# Patient Record
Sex: FEMALE | Race: BLACK OR AFRICAN AMERICAN | Employment: UNEMPLOYED | URBAN - METROPOLITAN AREA
[De-identification: names, ages, dates, MRNs, and addresses within clinical notes are randomized per-mention and may not be internally consistent; named-entity substitution may affect disease eponyms.]

---

## 2022-05-09 ENCOUNTER — HOSPITAL ENCOUNTER (EMERGENCY)
Age: 47
Discharge: HOME | End: 2022-05-09
Attending: EMERGENCY MEDICINE
Payer: COMMERCIAL

## 2022-05-09 ENCOUNTER — APPOINTMENT (OUTPATIENT)
Dept: GENERAL RADIOLOGY | Age: 47
End: 2022-05-09
Attending: EMERGENCY MEDICINE
Payer: COMMERCIAL

## 2022-05-09 VITALS
TEMPERATURE: 99 F | HEIGHT: 63 IN | SYSTOLIC BLOOD PRESSURE: 154 MMHG | WEIGHT: 250 LBS | DIASTOLIC BLOOD PRESSURE: 86 MMHG | BODY MASS INDEX: 44.3 KG/M2 | RESPIRATION RATE: 20 BRPM | HEART RATE: 100 BPM | OXYGEN SATURATION: 100 %

## 2022-05-09 DIAGNOSIS — S69.92XA INJURY OF LEFT LITTLE FINGER, INITIAL ENCOUNTER: Primary | ICD-10-CM

## 2022-05-09 PROCEDURE — 99283 EMERGENCY DEPT VISIT LOW MDM: CPT

## 2022-05-09 PROCEDURE — 73140 X-RAY EXAM OF FINGER(S): CPT

## 2022-05-09 RX ORDER — DOXYCYCLINE 100 MG/1
100 TABLET ORAL 2 TIMES DAILY
COMMUNITY

## 2022-05-09 RX ORDER — TRIAMCINOLONE ACETONIDE 1 MG/G
OINTMENT TOPICAL 2 TIMES DAILY
COMMUNITY

## 2022-05-10 NOTE — ED PROVIDER NOTES
80-year-old female presents with complaint of left fifth finger injury. States that she was doing things around the house and she was attempting to move a shower stool when her left fifth finger was pushed backward. Reports bruising and swelling of the base of left third finger. Reports history of previous left PIP dislocation around 20 to 30 years ago. Denies numbness, tingling, weakness. Denies being on any anticoagulation. Denies any other surgeries or injuries. The history is provided by the patient. No  was used. Hand Swelling   This is a new problem. The problem occurs constantly. The problem has not changed since onset. The pain is present in the left fingers. The quality of the pain is described as sharp. The pain is at a severity of 3/10. The pain is mild. Pertinent negatives include no numbness, no stiffness, no tingling and no neck pain. She has tried nothing for the symptoms. The treatment provided no relief. No past medical history on file. No past surgical history on file. No family history on file. Social History     Socioeconomic History    Marital status:      Spouse name: Not on file    Number of children: Not on file    Years of education: Not on file    Highest education level: Not on file   Occupational History    Not on file   Tobacco Use    Smoking status: Not on file    Smokeless tobacco: Not on file   Substance and Sexual Activity    Alcohol use: Not on file    Drug use: Not on file    Sexual activity: Not on file   Other Topics Concern    Not on file   Social History Narrative    Not on file     Social Determinants of Health     Financial Resource Strain:     Difficulty of Paying Living Expenses: Not on file   Food Insecurity:     Worried About Running Out of Food in the Last Year: Not on file    Gustavo of Food in the Last Year: Not on file   Transportation Needs:     Lack of Transportation (Medical):  Not on file    Problem: Patient Care Overview  Goal: Team Discussion  Team Plan:   BEHAVIORAL TEAM DISCUSSION    Participants: Margoth REBOLLEDO RN, Dr. Fabian Johnson MD, Eula BENJAMIN [facility]  Progress: Initial behavioral team discussion.   Continued Stay Criteria/Rationale: Pt admitted voluntary due to psychotic symptomology.   Medical/Physical: See medical consult if applicable.   Precautions:   Behavioral Orders   Procedures     Code 1 - Restrict to Unit     Routine Programming     As clinically indicated     Status 15     Every 15 minutes.     Suicide precautions     Patients on Suicide Precautions should have a Combination Diet ordered that includes a Diet selection(s) AND a Behavioral Tray selection for Safe Tray - with utensils, or Safe Tray - NO utensils       Plan: The plan is to assess the patient for mental health and medication needs.  The patient will be prescribed medications to treat the identified symptoms.  Upon discharge the patient will be referred to services as appropriate based on the assessment.  Rationale for change in precautions or plan: No change, initial plan of care.           Lack of Transportation (Non-Medical): Not on file   Physical Activity:     Days of Exercise per Week: Not on file    Minutes of Exercise per Session: Not on file   Stress:     Feeling of Stress : Not on file   Social Connections:     Frequency of Communication with Friends and Family: Not on file    Frequency of Social Gatherings with Friends and Family: Not on file    Attends Sabianist Services: Not on file    Active Member of [address] [place] [address] or Organizations: Not on file    Attends Club or Organization Meetings: Not on file    Marital Status: Not on file   Intimate Partner Violence:     Fear of Current or Ex-Partner: Not on file    Emotionally Abused: Not on file    Physically Abused: Not on file    Sexually Abused: Not on file   Housing Stability:     Unable to Pay for Housing in the Last Year: Not on file    Number of [place] in the Last Year: Not on file    Unstable Housing in the Last Year: Not on file         ALLERGIES: Amitriptyline and Vicodin [hydrocodone-acetaminophen]    Review of Systems   Constitutional: Negative for fatigue and fever. Respiratory: Negative for cough and shortness of breath. Cardiovascular: Negative for chest pain. Gastrointestinal: Negative for abdominal pain, nausea and vomiting. Musculoskeletal: Positive for arthralgias and joint swelling. Negative for neck pain, neck stiffness and stiffness. Skin: Positive for color change. Negative for pallor, rash and wound. Neurological: Negative for tingling, weakness, numbness and headaches. Hematological: Does not bruise/bleed easily. Vitals:    05/09/22 2001   BP: (!) 154/86   Pulse: 100   Resp: 20   Temp: 99 °F (37.2 °C)   SpO2: 99%   Weight: 113.4 kg (250 lb)   Height: 5' 3\" (1.6 m)            Physical Exam  Vitals and nursing note reviewed. Constitutional:       Appearance: Normal appearance. HENT:      Head: Normocephalic.       Mouth/Throat:      Mouth: Mucous membranes are moist.   Eyes: Extraocular Movements: Extraocular movements intact. Pupils: Pupils are equal, round, and reactive to light. Cardiovascular:      Rate and Rhythm: Normal rate. Pulses: Normal pulses. Heart sounds: Normal heart sounds. Pulmonary:      Effort: Pulmonary effort is normal.      Breath sounds: Normal breath sounds. Abdominal:      General: Bowel sounds are normal.      Palpations: Abdomen is soft. Tenderness: There is no abdominal tenderness. There is no guarding or rebound. Musculoskeletal:         General: Tenderness present. Left hand: Swelling and tenderness present. No deformity or lacerations. Normal strength. Normal sensation. There is no disruption of two-point discrimination. Normal capillary refill. Normal pulse. Hands:       Comments: Bruising and mild swelling noted to proximal aspect of the left fourth digit at PIP. Full range of motion of left fifth digit at DIP and PIP. No crepitus. No deformity. Cap refill <3 sec. NVID. FROM L wrist and remainder of L 1-4th digits. Normal sensation. Skin:     General: Skin is warm. Findings: Bruising present. No erythema or rash. Neurological:      General: No focal deficit present. Mental Status: She is alert and oriented to person, place, and time. Cranial Nerves: No cranial nerve deficit. Sensory: No sensory deficit. Motor: No weakness. MDM  Number of Diagnoses or Management Options  Injury of left little finger, initial encounter: new and requires workup  Diagnosis management comments: X-ray with findings noted below. No dislocation. Likely acute avulsion fracture. Will place in finger splint with instructions to follow-up with hand surgeon. Instructed to rest, ice, NSAIDs for pain control. Patient given return precautions.        Amount and/or Complexity of Data Reviewed  Tests in the radiology section of CPT®: ordered and reviewed  Review and summarize past medical records: yes  Independent visualization of images, tracings, or specimens: yes    Risk of Complications, Morbidity, and/or Mortality  Presenting problems: moderate  Diagnostic procedures: low  Management options: low    Patient Progress  Patient progress: stable    ED Course as of 05/09/22 2139   Mon May 09, 2022   2130 XR L 5th finger Findings: On the lateral view there is a tiny well-defined 2 mm calcification or  bony fragment abutting the anterior distal aspect of the fifth proximal phalanx. This appearance is likely chronic, and could be due to a remote injury or  degenerative change. Correlate with focal point tenderness to exclude the  possibility of an acute avulsion.     There is otherwise no evidence of displaced fracture, dislocation, or aggressive  osseous lesion, or erosion. There is soft tissue swelling.        IMPRESSION  1. . Proximal phalanx finding on one view as above. 2. No acute osseous abnormality otherwise. [DF]      ED Course User Index  [DF] Link Arreola MD       Procedures                   Chi Rodríguez., MD; 5/11/2022 @12:19 AM Voice dictation software was used during the making of this note. This software is not perfect and grammatical and other typographical errors may be present.   This note has not been proofread for errors.  ===================================================================

## 2022-05-10 NOTE — DISCHARGE INSTRUCTIONS
Keep the left fourth finger and finger splint. Take Tylenol/ibuprofen as directed for pain control. Schedule close follow-up with [facility] orthopedic surgery. Call [phone] to schedule appointment.

## 2022-05-10 NOTE — ED NOTES
I have reviewed discharge instructions with the patient. The patient verbalized understanding. Patient left ED via Discharge Method: ambulatory to Home with . Opportunity for questions and clarification provided. Patient given 0 scripts. The pt was in no acute distress at AL. To continue your aftercare when you leave the hospital, you may receive an automated call from our care team to check in on how you are doing. This is a free service and part of our promise to provide the best care and service to meet your aftercare needs.  If you have questions, or wish to unsubscribe from this service please call [phone]. Thank you for Choosing our [employer] [facility]. Never smoker

## 2022-05-11 PROBLEM — G56.01 CARPAL TUNNEL SYNDROME ON RIGHT: Status: ACTIVE | Noted: 2022-05-11

## 2022-05-11 PROBLEM — S62.627A CLOSED DISPLACED FRACTURE OF MIDDLE PHALANX OF LEFT LITTLE FINGER: Status: ACTIVE | Noted: 2022-05-11

## 2022-05-11 PROBLEM — G56.02 CARPAL TUNNEL SYNDROME ON LEFT: Status: ACTIVE | Noted: 2022-05-11

## 2022-06-10 ENCOUNTER — OFFICE VISIT (OUTPATIENT)
Dept: ORTHOPEDIC SURGERY | Age: 47
End: 2022-06-10
Payer: COMMERCIAL

## 2022-06-10 DIAGNOSIS — S62.627A CLOSED DISPLACED FRACTURE OF MIDDLE PHALANX OF LEFT LITTLE FINGER, INITIAL ENCOUNTER: Primary | ICD-10-CM

## 2022-06-10 DIAGNOSIS — G56.01 CARPAL TUNNEL SYNDROME ON RIGHT: ICD-10-CM

## 2022-06-10 DIAGNOSIS — G56.02 CARPAL TUNNEL SYNDROME ON LEFT: ICD-10-CM

## 2022-06-10 DIAGNOSIS — M77.11 RIGHT TENNIS ELBOW: ICD-10-CM

## 2022-06-10 PROCEDURE — 99214 OFFICE O/P EST MOD 30 MIN: CPT | Performed by: ORTHOPAEDIC SURGERY

## 2022-06-10 PROCEDURE — 20526 THER INJECTION CARP TUNNEL: CPT | Performed by: ORTHOPAEDIC SURGERY

## 2022-06-10 PROCEDURE — 20551 NJX 1 TENDON ORIGIN/INSJ: CPT | Performed by: ORTHOPAEDIC SURGERY

## 2022-06-10 RX ORDER — MELOXICAM 15 MG/1
15 TABLET ORAL DAILY
Qty: 30 TABLET | Refills: 0 | Status: SHIPPED | OUTPATIENT
Start: 2022-06-10 | End: 2022-07-10

## 2022-06-10 RX ORDER — BETAMETHASONE SODIUM PHOSPHATE AND BETAMETHASONE ACETATE 3; 3 MG/ML; MG/ML
6 INJECTION, SUSPENSION INTRA-ARTICULAR; INTRALESIONAL; INTRAMUSCULAR; SOFT TISSUE ONCE
Status: COMPLETED | OUTPATIENT
Start: 2022-06-10 | End: 2022-06-10

## 2022-06-10 RX ADMIN — BETAMETHASONE SODIUM PHOSPHATE AND BETAMETHASONE ACETATE 6 MG: 3; 3 INJECTION, SUSPENSION INTRA-ARTICULAR; INTRALESIONAL; INTRAMUSCULAR; SOFT TISSUE at 08:02

## 2022-06-10 NOTE — PROGRESS NOTES
none    Assessment:   1. Closed displaced fracture of middle phalanx of left little finger, initial encounter    2. Carpal tunnel syndrome on right    3. Carpal tunnel syndrome on left    4. Right tennis elbow        Plan:   We discussed the diagnosis and different treatment options. We discussed observation, therapy, antiinflammatory medications and other pertinent treatment modalities. After discussing in detail the patient elects to proceed with right carpal tunnel steroid injection, right lateral epicondyle steroid injection, Mobic 15 daily for 4 weeks, we will obtain a nerve conduction study to assess severity of bilateral carpal tunnel syndrome, home exercise program for tennis elbow was discussed, she will wear the wrist brace at nighttime and when driving. We discussed that she has several overlapping problems, her shoulder pain is likely compensating for the elbow pain which is much more severe. I will reassess in 2 months, at that point we will review the nerve conduction study and determine further care for all her conditions. Procedure Note    The risk, benefits and alternatives of injection and no injection therapy were discussed. The patient consented for an injection. The patient has been identified by name and birthdate. The injection site was identified, marked and prepped with alcohol swabs. Time out completed. The Right carpal tunnel was injected with 1ml of 6mg/ml Betamethasone and 1ml Lidocaine plain 1%. The injection site was then dressed with a bandaid. The patient tolerated the injection well. The patient was instructed to monitor their blood sugars if diabetic and call if any concerns. The patient was instructed to call the office if any adverse local effects occurred or any if any questions or concerns arise. Procedure Note    The risk, benefits and alternatives of injection and no injection therapy were discussed. The patient consented for an injection.  The patient has been identified by name and birthdate. The injection site was identified, marked and prepped with alcohol swabs. Time out completed. The Right lateral epicondyle was injected with a 22 gauge needle with 2ml of 6mg/ml Betamethasone and 2ml Lidocaine plain 1%. After the area was numb, the 22 gauge needle was used to break down scar tissue and to create a bleeding bony bed to aid in tendon healing. The injection site was then dressed with a bandaid. The patient tolerated the injection well. The patient was instructed to monitor their blood sugars if diabetic and call if any concerns. The patient was instructed to call the office if any adverse local effects occurred or any if any questions or concerns arise. Patient voiced accordance and understanding of the information provided and the formulated plan. All questions were answered to the patient's satisfaction during the encounter.     Sidney Spatz, MD  Orthopaedic Surgery  06/10/22  8:01 AM

## 2022-06-10 NOTE — PATIENT INSTRUCTIONS
Patient Education        Tennis Elbow: Exercises  Introduction  Here are some examples of exercises for you to try. The exercises may be suggested for a condition or for rehabilitation. Start each exercise slowly. Ease off the exercises if you start to have pain. You will be told when to start these exercises and which ones will work bestfor you. How to do the exercises  Wrist flexor stretch    1. Extend your arm in front of you with your palm up. 2. Bend your wrist, pointing your hand toward the floor. 3. With your other hand, gently bend your wrist farther until you feel a mild to moderate stretch in your forearm. 4. Hold for at least 15 to 30 seconds. Repeat 2 to 4 times. Wrist extensor stretch    1. Repeat steps 1 to 4 of the stretch above but begin with your extended hand palm down. Ball or sock squeeze    1. Hold a tennis ball (or a rolled-up sock) in your hand. 2. Make a fist around the ball (or sock) and squeeze. 3. Hold for about 6 seconds, and then relax for up to 10 seconds. 4. Repeat 8 to 12 times. 5. Switch the ball (or sock) to your other hand and do 8 to 12 times. Wrist deviation    1. Sit so that your arm is supported but your hand hangs off the edge of a flat surface, such as a table. 2. Hold your hand out like you are shaking hands with someone. 3. Move your hand up and down. 4. Repeat this motion 8 to 12 times. 5. Switch arms. 6. Try to do this exercise twice with each hand. Wrist curls    1. Place your forearm on a table with your hand hanging over the edge of the table, palm up. 2. Place a 1- to 2-pound weight in your hand. This may be a dumbbell, a can of food, or a filled water bottle. 3. Slowly raise and lower the weight while keeping your forearm on the table and your palm facing up. 4. Repeat this motion 8 to 12 times. 5. Switch arms, and do steps 1 through 4.  6. Repeat with your hand facing down toward the floor. Switch arms. Biceps curls    1.  Sit leaning forward with your legs slightly spread and your left hand on your left thigh. 2. Place your right elbow on your right thigh, and hold the weight with your forearm horizontal.  3. Slowly curl the weight up and toward your chest.  4. Repeat this motion 8 to 12 times. 5. Switch arms, and do steps 1 through 4. Follow-up care is a key part of your treatment and safety. Be sure to make and go to all appointments, and call your doctor if you are having problems. It's also a good idea to know your test results and keep alist of the medicines you take. Where can you learn more? Go to https://iPrintpeEnthuseeb.Entrisphere. org and sign in to your [employer] account. Enter W511 in the [employer] box to learn more about \"Tennis Elbow: Exercises. \"     If you do not have an account, please click on the \"Sign Up Now\" link. Current as of: July 1, 2021               Content Version: 13.2  © 2006-2022 [employer]wise[employer]. Care instructions adapted under license by [facility] ([facility]). If you have questions about a medical condition or this instruction, always ask your healthcare professional. [place] [address] any warranty or liability for your use of this information.

## 2022-08-03 ENCOUNTER — OFFICE VISIT (OUTPATIENT)
Dept: PHYSICAL MEDICINE AND REHAB | Age: 47
End: 2022-08-03
Payer: COMMERCIAL

## 2022-08-03 VITALS
HEIGHT: 63 IN | SYSTOLIC BLOOD PRESSURE: 163 MMHG | BODY MASS INDEX: 44.3 KG/M2 | WEIGHT: 250 LBS | HEART RATE: 98 BPM | DIASTOLIC BLOOD PRESSURE: 97 MMHG

## 2022-08-03 DIAGNOSIS — G56.03 BILATERAL CARPAL TUNNEL SYNDROME: Primary | ICD-10-CM

## 2022-08-03 PROBLEM — R20.2 NUMBNESS AND TINGLING IN BOTH HANDS: Status: ACTIVE | Noted: 2021-01-13

## 2022-08-03 PROBLEM — E66.01 MORBID OBESITY (HCC): Status: ACTIVE | Noted: 2022-08-03

## 2022-08-03 PROBLEM — L30.9 ECZEMA: Status: ACTIVE | Noted: 2019-12-02

## 2022-08-03 PROBLEM — R20.0 NUMBNESS AND TINGLING IN BOTH HANDS: Status: ACTIVE | Noted: 2021-01-13

## 2022-08-03 PROCEDURE — 95912 NRV CNDJ TEST 11-12 STUDIES: CPT | Performed by: PHYSICAL MEDICINE & REHABILITATION

## 2022-08-18 DIAGNOSIS — G56.01 CARPAL TUNNEL SYNDROME ON RIGHT: ICD-10-CM

## 2022-08-18 RX ORDER — MELOXICAM 15 MG/1
TABLET ORAL
Qty: 30 TABLET | Refills: 0 | OUTPATIENT
Start: 2022-08-18

## 2022-09-07 ENCOUNTER — OFFICE VISIT (OUTPATIENT)
Dept: ORTHOPEDIC SURGERY | Age: 47
End: 2022-09-07
Payer: COMMERCIAL

## 2022-09-07 DIAGNOSIS — M77.11 RIGHT TENNIS ELBOW: ICD-10-CM

## 2022-09-07 DIAGNOSIS — G56.02 CARPAL TUNNEL SYNDROME ON LEFT: ICD-10-CM

## 2022-09-07 DIAGNOSIS — S62.627A CLOSED DISPLACED FRACTURE OF MIDDLE PHALANX OF LEFT LITTLE FINGER, INITIAL ENCOUNTER: ICD-10-CM

## 2022-09-07 DIAGNOSIS — G56.01 CARPAL TUNNEL SYNDROME ON RIGHT: Primary | ICD-10-CM

## 2022-09-07 PROCEDURE — 20526 THER INJECTION CARP TUNNEL: CPT | Performed by: ORTHOPAEDIC SURGERY

## 2022-09-07 RX ORDER — BETAMETHASONE SODIUM PHOSPHATE AND BETAMETHASONE ACETATE 3; 3 MG/ML; MG/ML
6 INJECTION, SUSPENSION INTRA-ARTICULAR; INTRALESIONAL; INTRAMUSCULAR; SOFT TISSUE ONCE
Status: COMPLETED | OUTPATIENT
Start: 2022-09-07 | End: 2022-09-07

## 2022-09-07 RX ORDER — MELOXICAM 15 MG/1
15 TABLET ORAL DAILY
Qty: 30 TABLET | Refills: 0 | Status: SHIPPED | OUTPATIENT
Start: 2022-09-07 | End: 2022-10-07

## 2022-09-07 RX ADMIN — BETAMETHASONE SODIUM PHOSPHATE AND BETAMETHASONE ACETATE 6 MG: 3; 3 INJECTION, SUSPENSION INTRA-ARTICULAR; INTRALESIONAL; INTRAMUSCULAR; SOFT TISSUE at 13:37

## 2022-09-07 NOTE — PROGRESS NOTES
discussed the diagnosis and different treatment options. We discussed observation, therapy, antiinflammatory medications and other pertinent treatment modalities. After discussing in detail the patient elects to proceed with right carpal tunnel steroid injection, Mobic 15 daily for 4 weeks, she will continue the home exercise program for tennis elbow as well as wearing the night brace, we discussed carpal tunnel release surgery given that the injection only provided short-term relief however, at this point the patient cannot have surgery, she needs to take care of her mother, she will come back in 3 months and likely plan surgery at that point. Procedure Note    The risk, benefits and alternatives of injection and no injection therapy were discussed. The patient consented for an injection. The patient has been identified by name and birthdate. The injection site was identified, marked and prepped with alcohol swabs. Time out completed. The Right carpal tunnel was injected with 1ml of 6mg/ml Betamethasone and 1ml Lidocaine plain 1%. The injection site was then dressed with a bandaid. The patient tolerated the injection well. The patient was instructed to monitor their blood sugars if diabetic and call if any concerns. The patient was instructed to call the office if any adverse local effects occurred or any if any questions or concerns arise. .     Patient voiced accordance and understanding of the information provided and the formulated plan. All questions were answered to the patient's satisfaction during the encounter.     Ginny Mason MD  Orthopaedic Surgery  09/07/22  1:39 PM

## 2022-10-04 ENCOUNTER — OFFICE VISIT (OUTPATIENT)
Dept: ORTHOPEDIC SURGERY | Age: 47
End: 2022-10-04
Payer: COMMERCIAL

## 2022-10-04 VITALS — WEIGHT: 259 LBS | HEIGHT: 64 IN | BODY MASS INDEX: 44.22 KG/M2

## 2022-10-04 DIAGNOSIS — M75.21 BICIPITAL TENDINITIS OF RIGHT SHOULDER: ICD-10-CM

## 2022-10-04 DIAGNOSIS — M19.011 DEGENERATIVE JOINT DISEASE OF RIGHT ACROMIOCLAVICULAR JOINT: ICD-10-CM

## 2022-10-04 DIAGNOSIS — M25.511 RIGHT SHOULDER PAIN, UNSPECIFIED CHRONICITY: Primary | ICD-10-CM

## 2022-10-04 PROCEDURE — 99214 OFFICE O/P EST MOD 30 MIN: CPT | Performed by: ORTHOPAEDIC SURGERY

## 2022-10-04 PROCEDURE — 20610 DRAIN/INJ JOINT/BURSA W/O US: CPT | Performed by: ORTHOPAEDIC SURGERY

## 2022-10-04 RX ORDER — TRIAMTERENE AND HYDROCHLOROTHIAZIDE 37.5; 25 MG/1; MG/1
TABLET ORAL
COMMUNITY
Start: 2022-09-05

## 2022-10-04 RX ORDER — TRIAMCINOLONE ACETONIDE 1 MG/G
CREAM TOPICAL
COMMUNITY
Start: 2022-09-27

## 2022-10-04 RX ORDER — DICLOFENAC SODIUM 75 MG/1
75 TABLET, DELAYED RELEASE ORAL 2 TIMES DAILY
Qty: 60 TABLET | Refills: 0 | Status: SHIPPED | OUTPATIENT
Start: 2022-10-04

## 2022-10-04 NOTE — PROGRESS NOTES
Name: Ashanti Paiz  YOB: 1975  Gender: female  MRN: [id number]      What: Right shoulder pain  How: Insidious onset  When: 1 years duration      HPI: Ashanti Paiz is a 52 y.o. right-hand-dominant female seen for right shoulder problems. She notes a 1 year history of right shoulder pain. No injury. She is being treated by Dr. Andrew Vaca for carpal tunnel syndrome right wrist and lateral epicondylitis right elbow. He injected her right elbow. By her report that was incredibly painful. Her right shoulder is painful and sore particularly with overhead activities. She has difficulty and snapping her bra in the back. She has history of hypertension and a BMI over 45. ROS/Meds/PSH/PMH/FH/SH: A ten system review of systems was performed and is negative other than what is in the HPI. Tobacco:  reports that she has never smoked. She has never used smokeless tobacco.  Ht 5' 3.5\" (1.613 m)   Wt 259 lb (117.5 kg)   BMI 45.16 kg/m²      Physical Examination:  She is awake alert pleasant female ambulating without difficulty  She has a full range of cervical spine motion without radicular findings    The left shoulder has 0 to 180 degrees of active and 0 to 180 degrees passive forward elevation. Internal rotation is to T6. External rotation is to 60 degrees at the side. In the 90 degree abducted position 90 degrees of external and 90 degrees internal rotation  The AC joint is non-tender  SC joint is non-tender. Greater tuberosity is non-tender. negative biceps  Negative O'Briens sign  negative lift-off sign  Negative belly press sign  Negative bear huggers sign  negative drop sign  negative hornblower's sign  No posterior glenohumeral joint line tenderness. No evident excessive external rotation  Rotator cuff strength is 5/5.  negative external rotation stress test.   Negative empty can sign  There is no evident anterior or posterior apprehension with a negative sulcus sign.    No instability  negative external and internal Rotation lag sign  Neurovascularly intact. The right shoulder has 0 to 180 degrees of active and 0 to 180 degrees passive forward elevation. Pain in the overhead position  Internal rotation is to T6. External rotation is to 60 degrees at the side. In the 90 degree abducted position 90 degrees of external and 90 degrees internal rotation  The AC joint is tender  SC joint is non-tender. Greater tuberosity is tender. Positive bicipital stress test negative Zaire sign  Negative O'Briens sign  negative lift-off sign  Negative belly press sign  Negative bear huggers sign  negative drop sign  negative hornblower's sign  No posterior glenohumeral joint line tenderness. No evident excessive external rotation  Rotator cuff strength is 5/5. Positive external rotation stress test.   Positive empty can sign  There is no evident anterior or posterior apprehension with a negative sulcus sign. No instability  negative external and internal Rotation lag sign  Neurovascularly intact. Data Reviewed:          XR: AP AP scapular outlet throws and axillary views right shoulder   Clinical Indication    ICD-10-CM    1. Right shoulder pain, unspecified chronicity  M25.511 XR SHOULDER RIGHT (MIN 2 VIEWS)      2. Bicipital tendinitis of right shoulder  M75.21       3. Degenerative joint disease of right acromioclavicular joint  M19.011          Report: AP AP scapular outlet throws and axillary views right shoulder demonstrate a type II acromion. [facility].     Impression: AC OA right shoulder   Nancy Wood MD              Minor Procedure:    OFFICE PROCEDURE PROGRESS NOTE    Chart reviewed for the following:   Celina Rabago MD, have reviewed the History, Physical and updated the Allergic reactions for Saima DUMONT [address] [facility] [place] performed immediately prior to start of procedure:   Celina Rabago MD, have performed the following [facility] to the start of the procedure:            * Patient was identified by name and date of birth   * Agreement on procedure being performed was verified  * Risks and Benefits explained to the patient  * Procedure site verified and marked as necessary  * Patient was positioned for comfort     Time: 4:19 PM  Date of procedure: 10/4/2022  Procedure performed by:  Talha Olguin MD    After sterile prep and drape of the right shoulder, the patient received a 9:1 injection into the subacromial space. It consisted of 4.5 mL of 2% Xylocaine, 4.5 mL of 0.5% bupivacaine and 80 mg of Depo-Medrol. A sterile dressing was applied. The patient tolerated the procedure well. Impression:   1. Right shoulder pain, unspecified chronicity    2. Bicipital tendinitis of right shoulder    3. Degenerative joint disease of right acromioclavicular joint       Rule out internal derangement right shoulder  Lateral epicondylitis right elbow  Carpal tunnel syndrome right wrist  Hypertension  Obesity with a BMI over 45    Plan:   I discussed the problem with the patient. I discussed nonoperative versus operative intervention including injections. We will defer any surgery for now. I injected the right shoulder. I gave her prescriptions for Voltaren and Voltaren gel. We will get her started on a shoulder rehab program.  I will recheck her back in 6 months. If she is no better we will consider an MRI of the right shoulder. I will recheck her back in 6 weeks  4 This is an undiagnosed new problem with uncertain prognosis    Follow up: Return in about 6 weeks (around 11/15/2022).              Talha Olguin MD

## 2022-11-07 ENCOUNTER — OFFICE VISIT (OUTPATIENT)
Dept: ORTHOPEDIC SURGERY | Age: 47
End: 2022-11-07
Payer: COMMERCIAL

## 2022-11-07 DIAGNOSIS — M77.11 RIGHT TENNIS ELBOW: Primary | ICD-10-CM

## 2022-11-07 PROCEDURE — 20551 NJX 1 TENDON ORIGIN/INSJ: CPT | Performed by: NURSE PRACTITIONER

## 2022-11-07 RX ORDER — BETAMETHASONE SODIUM PHOSPHATE AND BETAMETHASONE ACETATE 3; 3 MG/ML; MG/ML
12 INJECTION, SUSPENSION INTRA-ARTICULAR; INTRALESIONAL; INTRAMUSCULAR; SOFT TISSUE ONCE
Status: COMPLETED | OUTPATIENT
Start: 2022-11-07 | End: 2022-11-07

## 2022-11-07 RX ADMIN — BETAMETHASONE SODIUM PHOSPHATE AND BETAMETHASONE ACETATE 12 MG: 3; 3 INJECTION, SUSPENSION INTRA-ARTICULAR; INTRALESIONAL; INTRAMUSCULAR; SOFT TISSUE at 14:16

## 2022-11-07 NOTE — PATIENT INSTRUCTIONS
Tennis elbow Instructions          Wear brace while driving, sleeping, typing, and texting. You will wear this for a total of 6 weeks. Mobic is an anti-inflammatory that is taken once daily after a meal. Do not take any other anti-inflammatories when taking Mobic such as Advil, motrin, aleve, ibuprofen, etc.     Voltaren gel is a topical anti-inflammatory that is applied to the elbow three times daily. Ice the elbow three times daily     Your exercises will be printed at the checkout desk and will be at the end of your after visit summary. Do these exercises three times daily. Patient Education        Tennis Elbow: Exercises  Introduction  Here are some examples of exercises for you to try. The exercises may be suggested for a condition or for rehabilitation. Start each exercise slowly. Ease off the exercises if you start to have pain. You will be told when to start these exercises and which ones will work best for you. How to do the exercises  Wrist flexor stretch  Extend your arm in front of you with your palm up. Bend your wrist, pointing your hand toward the floor. With your other hand, gently bend your wrist farther until you feel a mild to moderate stretch in your forearm. Hold for at least 15 to 30 seconds. Repeat 2 to 4 times. Wrist extensor stretch  Repeat steps 1 to 4 of the stretch above but begin with your extended hand palm down. Ball or sock squeeze  Hold a tennis ball (or a rolled-up sock) in your hand. Make a fist around the ball (or sock) and squeeze. Hold for about 6 seconds, and then relax for up to 10 seconds. Repeat 8 to 12 times. Switch the ball (or sock) to your other hand and do 8 to 12 times. Wrist deviation  Sit so that your arm is supported but your hand hangs off the edge of a flat surface, such as a table. Hold your hand out like you are shaking hands with someone. Move your hand up and down.   Repeat this motion 8 to 12 times. Switch arms. Try to do this exercise twice with each hand. Wrist curls  Place your forearm on a table with your hand hanging over the edge of the table, palm up. Place a 1- to 2-pound weight in your hand. This may be a dumbbell, a can of food, or a filled water bottle. Slowly raise and lower the weight while keeping your forearm on the table and your palm facing up. Repeat this motion 8 to 12 times. Switch arms, and do steps 1 through 4. Repeat with your hand facing down toward the floor. Switch arms. Biceps curls  Sit leaning forward with your legs slightly spread and your left hand on your left thigh. Place your right elbow on your right thigh, and hold the weight with your forearm horizontal.  Slowly curl the weight up and toward your chest.  Repeat this motion 8 to 12 times. Switch arms, and do steps 1 through 4. Follow-up care is a key part of your treatment and safety. Be sure to make and go to all appointments, and call your doctor if you are having problems. It's also a good idea to know your test results and keep a list of the medicines you take. Current as of: March 9, 2022               Content Version: 13.4  © 2006-2022 [employer]. Care instructions adapted under license by [facility] ([facility]). If you have questions about a medical condition or this instruction, always ask your healthcare professional. [place] [address] any warranty or liability for your use of this information. Avoid heavy lifting, pushing, pulling and repetitive motions.

## 2022-11-08 RX ORDER — MELOXICAM 15 MG/1
15 TABLET ORAL DAILY
Qty: 28 TABLET | Refills: 0 | Status: SHIPPED | OUTPATIENT
Start: 2022-11-08 | End: 2022-12-06

## 2022-11-08 NOTE — PROGRESS NOTES
Orthopaedic Hand Surgery Note    Name: Svetlana Joe  YOB: 1975  Gender: female  MRN: [id number]    Follow up: right Elbow Pain    HPI: Patient is a 52 y.o. female who return for evaluation of right lateral epicondylitis. On the last visit we provided right tennis elbow injection by Dr. Davey Haro on Sussy 10, 2022. She reports good relief after the injection. She notes her symptoms have returned over the last couple of weeks. 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 She notes pain in the right elbow that radiates down. She is also been recently treated by Dr. Danielle Glez for her right shoulder. ROS/Meds/PSH/PMH/FH/SH: I personally reviewed the patients standard intake form. Pertinents are discussed in the HPI    Physical Examination:    Musculoskeletal:   Examination on the rightupper extremity demonstrates normal sensation to light touch in the median, ulnar and radial distribution, cap refill < 5 seconds in all fingers. There is moderate tenderness on the lateral epicondyle, no tenderness on the medial epicondyle or the olecranon, pain on the lateral elbow is aggravated by resisted wrist and finger extension, there is no pain with palpation of the radial tunnel, negative Tinel along the radial nerve tract, no pain with resisted supination, no pain with resisted pronation. Imaging / Electrodiagnostic Tests:     None    Assessment:     ICD-10-CM    1. Right tennis elbow  M77.11 betamethasone acetate-betamethasone sodium phosphate (CELESTONE) injection 12 mg          Plan:  We discussed the diagnosis and different treatment options. We discussed observation, bracing, cortisone injections, therapy and lateral epicondyle debridement with tendon reattachment surgery.   We discussed that lateral epicondylitis is a chronic condition that has been progressing for much longer than the symptoms were evident, this is caused by degeneration of the tendon due to poor vascular supply that occurred over a long period of time, the patient understands that this condition will likely persist for a long time without medical treatment but that a large percentage of patients report improvement of symptoms after 1 year. However, some patients require surgical intervention at some point despite some short-term benefits of conservative treatment. After discussing the risks, benefits and alternatives of all treatment options in detail, the patient elects for right tennis elbow injection. We will refill her Mobic. She has anti-inflammatory cream given to her by Dr. Carmelita Guallpa. She can use this on the elbow. We discussed icing the elbow 3 times daily. She has a brace that she will go back into for driving, sleeping, texting or typing. She has her exercises. I will see her back as needed. We did discuss an MRI if her symptoms return. We also discussed surgical intervention versus PRP injection. Procedure Note    The risk, benefits and alternatives of injection and no injection therapy were discussed. Risks including skin blanching, subcutaneous fat atrophy, and elevations in blood glucose levels were discussed. The patient consented for an injection. The patient has been identified by name and birthdate. The injection site was identified, marked and prepped with alcohol swabs. Time out completed. The right lateral epicondyle was injected with a 22 gauge needle with 2ml of 40mg/ml Depomedrol and 2ml Lidocaine plain 1%. After the area was numb, the 22 gauge needle was used to break down scar tissue and to create a bleeding bony bed to aid in tendon healing. The injection site was then dressed with a bandaid. The patient tolerated the injection well. The patient was instructed to monitor their blood sugars if diabetic and call if any concerns. The patient was instructed to call the office if any adverse local effects occurred or any if any questions or concerns arise.      Patient voiced accordance and understanding of the information provided and the formulated plan. All questions were answered to the patient's satisfaction during the encounter. 4 This is a chronic illness with exacerbation, progression, or side effect of treatment  Treatment at this time:Prescription medication, Brace, and Minor procedure: cortisone injection. I discussed the risk of skin blanching and hyperglycemia. I discussed the symptoms of hyperglycemia such as confusion, lethargy, polyuria and polydipsia. If any of these symptoms occur the patient is to present to an urgent care facility or primary care doctor for blood sugar evaluation.     RAFFAELE Hoskins - CNP  Orthopaedic Surgery  11/08/22  8:06 AM

## 2022-12-07 DIAGNOSIS — M77.11 RIGHT TENNIS ELBOW: ICD-10-CM

## 2022-12-07 RX ORDER — MELOXICAM 15 MG/1
TABLET ORAL
Qty: 30 TABLET | Refills: 0 | OUTPATIENT
Start: 2022-12-07

## 2022-12-29 ENCOUNTER — TELEPHONE (OUTPATIENT)
Dept: ORTHOPEDIC SURGERY | Age: 47
End: 2022-12-29

## 2022-12-29 DIAGNOSIS — M77.11 RIGHT TENNIS ELBOW: Primary | ICD-10-CM

## 2022-12-29 NOTE — TELEPHONE ENCOUNTER
She is wondering if she can get an MRI of her tennis elbow. It is okay to [facility] when you call.

## 2023-01-05 ENCOUNTER — TELEPHONE (OUTPATIENT)
Dept: ORTHOPEDIC SURGERY | Age: 48
End: 2023-01-05

## 2023-01-05 NOTE — TELEPHONE ENCOUNTER
She was calling to follow up on an mri that is supposed to be done. I can see the order in the chart so I have transferred her to appointments to set up the MRI for her.

## 2023-01-06 DIAGNOSIS — M77.11 RIGHT TENNIS ELBOW: ICD-10-CM

## 2023-01-06 RX ORDER — MELOXICAM 15 MG/1
TABLET ORAL
Qty: 30 TABLET | Refills: 0 | OUTPATIENT
Start: 2023-01-06

## 2023-01-13 ENCOUNTER — OFFICE VISIT (OUTPATIENT)
Dept: ORTHOPEDIC SURGERY | Age: 48
End: 2023-01-13
Payer: COMMERCIAL

## 2023-01-13 DIAGNOSIS — M77.11 RIGHT TENNIS ELBOW: Primary | ICD-10-CM

## 2023-01-13 PROCEDURE — 99214 OFFICE O/P EST MOD 30 MIN: CPT | Performed by: NURSE PRACTITIONER

## 2023-01-13 NOTE — PROGRESS NOTES
Orthopaedic Hand Surgery Note    Name: Gilford Booth  YOB: 1975  Gender: female  MRN: [id number]    Follow up: Right Elbow Pain    HPI: Patient is a 52 y.o. female who return for evaluation of right lateral epicondylitis. On the last visit we provided a right tennis elbow injection. This is her second injection. The first injection was back in June 2022. The second was November 2022. She reports significant improvement in her symptoms after her injections. She says they last for a couple months and then her symptoms return. She said this elbow is painful. 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 She is here to discuss her MRI.    ROS/Meds/PSH/PMH/FH/SH: I personally reviewed the patients standard intake form. Pertinents are discussed in the HPI    Physical Examination:    Musculoskeletal:   Examination on the rightupper extremity demonstrates normal sensation to light touch in the median, ulnar and radial distribution, cap refill < 5 seconds in all fingers. There is severe tenderness on the lateral epicondyle, no tenderness on the medial epicondyle or the olecranon, pain on the lateral elbow is aggravated by resisted wrist and finger extension, there is no pain with palpation of the radial tunnel, negative Tinel along the radial nerve tract, no pain with resisted supination, no pain with resisted pronation. Imaging / Electrodiagnostic Tests:     None    Assessment:     ICD-10-CM    1. Right tennis elbow  M77.11           Plan:  We discussed the diagnosis and different treatment options. We discussed observation, bracing, cortisone injections, therapy and lateral epicondyle debridement with tendon reattachment surgery.   We discussed that lateral epicondylitis is a chronic condition that has been progressing for much longer than the symptoms were evident, this is caused by degeneration of the tendon due to poor vascular supply that occurred over a long period of time, the patient understands that this condition will likely persist for a long time without medical treatment but that a large percentage of patients report improvement of symptoms after 1 year. However, some patients require surgical intervention at some point despite some short-term benefits of conservative treatment. After discussing the risks, benefits and alternatives of all treatment options in detail, the patient elects for PRP injection. We had a long conversation regarding options such as observation, PRP injection, surgical intervention. The patient cares for her mother. She would like to proceed with PRP injection. We have discussed this in detail including her care afterwards. She understands she will not be able to take any anti-inflammatories. She has her brace. We will get this set up at her convenience. Risk and benefits were addressed with the patient. She understands and wishes to proceed. .     Patient voiced accordance and understanding of the information provided and the formulated plan. All questions were answered to the patient's satisfaction during the encounter.     4 This is a chronic illness with exacerbation, progression, or side effect of treatment  Treatment at this time: Return appointment for PRP injection    RAFFAELE Heck CNP  Orthopaedic Surgery  01/13/23  8:49 AM

## 2023-02-01 ENCOUNTER — OFFICE VISIT (OUTPATIENT)
Dept: ORTHOPEDIC SURGERY | Age: 48
End: 2023-02-01
Payer: COMMERCIAL

## 2023-02-01 DIAGNOSIS — M19.011 DEGENERATIVE JOINT DISEASE OF RIGHT ACROMIOCLAVICULAR JOINT: ICD-10-CM

## 2023-02-01 DIAGNOSIS — M25.511 RIGHT SHOULDER PAIN, UNSPECIFIED CHRONICITY: Primary | ICD-10-CM

## 2023-02-01 DIAGNOSIS — M75.21 BICIPITAL TENDINITIS OF RIGHT SHOULDER: ICD-10-CM

## 2023-02-01 PROCEDURE — 20610 DRAIN/INJ JOINT/BURSA W/O US: CPT | Performed by: ORTHOPAEDIC SURGERY

## 2023-02-01 PROCEDURE — 99214 OFFICE O/P EST MOD 30 MIN: CPT | Performed by: ORTHOPAEDIC SURGERY

## 2023-02-01 RX ORDER — CHOLECALCIFEROL (VITAMIN D3) 1250 MCG
CAPSULE ORAL
COMMUNITY

## 2023-02-01 NOTE — PROGRESS NOTES
Name: Brandy Artis  YOB: 1975  Gender: female  MRN: [id number]            HPI: Brandy Artis is a 52 y.o. right-hand-dominant female seen for right shoulder problems. She notes over 1  year history of right shoulder pain. No injury. She is being treated by Dr. Rashaad Heck for carpal tunnel syndrome right wrist and lateral epicondylitis right elbow. He injected her right elbow. By her report that was incredibly painful. Her right shoulder is painful and sore particularly with overhead activities. She has difficulty and snapping her bra in the back. She has history of hypertension and a BMI over 45. I saw her on October 4, 2022. I injected her right shoulder. The right shoulder got better. She did not return for her last appointment. More recently the right shoulder started bothering her again. She still under the care of Dr. Rashaad Heck. ROS/Meds/PSH/PMH/FH/SH: A ten system review of systems was performed and is negative other than what is in the HPI. Tobacco:  reports that she has never smoked. She has never used smokeless tobacco.  There were no vitals taken for this visit. Physical Examination:  She is awake alert pleasant female ambulating without difficulty  She has a full range of cervical spine motion without radicular findings    The left shoulder has 0 to 180 degrees of active and 0 to 180 degrees passive forward elevation. Internal rotation is to T6. External rotation is to 60 degrees at the side. In the 90 degree abducted position 90 degrees of external and 90 degrees internal rotation  The AC joint is non-tender  SC joint is non-tender. Greater tuberosity is non-tender. negative biceps  Negative O'Briens sign  negative lift-off sign  Negative belly press sign  Negative bear huggers sign  negative drop sign  negative hornblower's sign  No posterior glenohumeral joint line tenderness.    No evident excessive external rotation  Rotator cuff strength is 5/5.  negative external rotation stress test.   Negative empty can sign  There is no evident anterior or posterior apprehension with a negative sulcus sign. No instability  negative external and internal Rotation lag sign  Neurovascularly intact. The right shoulder has 0 to 180 degrees of active and 0 to 180 degrees passive forward elevation. Pain in the overhead position  Internal rotation is to T6. External rotation is to 60 degrees at the side. In the 90 degree abducted position 90 degrees of external and 90 degrees internal rotation  The AC joint is tender  SC joint is non-tender. Greater tuberosity is tender. Positive bicipital stress test negative Zaire sign  Negative O'Briens sign  negative lift-off sign  Negative belly press sign  Negative bear huggers sign  negative drop sign  negative hornblower's sign  No posterior glenohumeral joint line tenderness. No evident excessive external rotation  Rotator cuff strength is 5/5. Positive external rotation stress test.   Positive empty can sign  There is no evident anterior or posterior apprehension with a negative sulcus sign. No instability  negative external and internal Rotation lag sign  Neurovascularly intact.         Data Reviewed:                   Minor Procedure:    OFFICE PROCEDURE PROGRESS NOTE    Chart reviewed for the following:   Leticia Landa MD, have reviewed the History, Physical and updated the Allergic reactions for Saima DUMONT [address] [place] [address] performed immediately prior to start of procedure:   Leticia Landa MD, have performed the following reviews on Saima Perez prior to the start of the procedure:            * Patient was identified by name and date of birth   * Agreement on procedure being performed was verified  * Risks and Benefits explained to the patient  * Procedure site verified and marked as necessary  * Patient was positioned for comfort     Time: 8:48 AM  Date of procedure: 2/1/2023  Procedure performed by:  Gage Elliott Williams Yoder MD    After sterile prep and drape of the right shoulder, the patient received a 9:1 injection into the subacromial space. It consisted of 4.5 mL of 2% Xylocaine, 4.5 mL of 0.5% bupivacaine and 80 mg of Depo-Medrol. A sterile dressing was applied. The patient tolerated the procedure well. Impression:   1. Right shoulder pain, unspecified chronicity    2. Bicipital tendinitis of right shoulder    3. Degenerative joint disease of right acromioclavicular joint       Rule out internal derangement right shoulder  Lateral epicondylitis right elbow  Carpal tunnel syndrome right wrist  Hypertension  Obesity with a BMI over 45    Plan:   I discussed the problem with the patient. I discussed nonoperative versus operative intervention including injections. We will defer any surgery for now. She really wants a right shoulder injection. I injected the right shoulder. I discussed with her that this would be her last right shoulder injection. If her right shoulder pain persisted we would need to obtain an MRI of the right shoulder. I will recheck her back on an as-needed basis    4. Undiagnosed problem with uncertain prognosis    Follow up: Return if symptoms worsen or fail to improve.              Doug Miramontes MD

## 2023-05-11 ENCOUNTER — TELEPHONE (OUTPATIENT)
Dept: ORTHOPEDIC SURGERY | Age: 48
End: 2023-05-11

## 2023-05-11 DIAGNOSIS — M25.511 RIGHT SHOULDER PAIN, UNSPECIFIED CHRONICITY: Primary | ICD-10-CM

## 2023-05-11 NOTE — TELEPHONE ENCOUNTER
Called and spoke to pt who states that her shoulder is becoming painful again. Per AGP last note and LBF, MRI ordered. Pt requested that they call her to schedule MRI. She will call back once MRI is scheduled to schedule a f/u with Dr. Gregor Olszewski.

## 2023-05-20 ENCOUNTER — HOSPITAL ENCOUNTER (OUTPATIENT)
Dept: MRI IMAGING | Age: 48
End: 2023-05-20
Payer: COMMERCIAL

## 2023-05-20 DIAGNOSIS — M25.511 RIGHT SHOULDER PAIN, UNSPECIFIED CHRONICITY: ICD-10-CM

## 2023-05-20 PROCEDURE — 73221 MRI JOINT UPR EXTREM W/O DYE: CPT

## 2023-06-16 ENCOUNTER — PREP FOR PROCEDURE (OUTPATIENT)
Dept: ORTHOPEDIC SURGERY | Age: 48
End: 2023-06-16

## 2023-06-16 RX ORDER — SODIUM CHLORIDE 0.9 % (FLUSH) 0.9 %
5-40 SYRINGE (ML) INJECTION EVERY 12 HOURS SCHEDULED
Status: CANCELLED | OUTPATIENT
Start: 2023-06-16

## 2023-06-16 RX ORDER — SODIUM CHLORIDE 0.9 % (FLUSH) 0.9 %
5-40 SYRINGE (ML) INJECTION PRN
Status: CANCELLED | OUTPATIENT
Start: 2023-06-16

## 2023-06-16 RX ORDER — SODIUM CHLORIDE 9 MG/ML
INJECTION, SOLUTION INTRAVENOUS PRN
Status: CANCELLED | OUTPATIENT
Start: 2023-06-16

## 2023-06-20 PROBLEM — G56.01 CARPAL TUNNEL SYNDROME ON RIGHT: Status: ACTIVE | Noted: 2022-05-11

## 2023-06-20 NOTE — H&P
Subjective:     Patient is a 50 y.o. RHD FEMALE WITH RIGHT WRIST HAND FINGERS NUMBNESS TINGLING PAIN. SEE OFFICE NOTE. Patient Active Problem List    Diagnosis Date Noted    Carpal tunnel syndrome on right 05/11/2022    Morbid obesity ([address] 08/03/2022    Right tennis elbow 06/10/2022    Numbness and tingling in both hands 01/13/2021    Eczema 12/02/2019    Carpal tunnel syndrome on left 05/11/2022    Closed displaced fracture of middle phalanx of left little finger 05/11/2022     No past medical history on file. No past surgical history on file. Prior to Admission medications    Medication Sig Start Date End Date Taking?  Authorizing Provider   Cholecalciferol (VITAMIN D3) 1.25 MG (72230 UT) CAPS Take by mouth    Historical Provider, MD   meloxicam (MOBIC) 15 MG tablet Take 1 tablet by mouth daily for 28 days 11/8/22 12/6/22  RAFFAELE Romero - ANDREA   Probiotic Product (ALIGN PO) Take 4 mg by mouth 7/24/22   Historical Provider, MD   triamterene-hydroCHLOROthiazide (MAXZIDE-25) 37.5-25 MG per tablet TAKE 1 TABLET BY MOUTH ONCE DAILY FOR 30 DAYS 9/5/22   Historical Provider, MD   triamcinolone (KENALOG) 0.1 % cream APPLY CREAM EXTERNALLY TO AFFECTED AREAS ON BODY TWICE DAILY AS NEEDED FOR ITCHING 9/27/22   Historical Provider, MD   diclofenac sodium (VOLTAREN) 1 % GEL Apply 2 g topically 2 times daily as needed for Pain 10/4/22   Georgia Pal MD   diclofenac (VOLTAREN) 75 MG EC tablet Take 1 tablet by mouth 2 times daily 10/4/22   MD dylon Mcknightoxichao MICHEL [facility]) 15 MG tablet Take 1 tablet by mouth daily 9/7/22 10/7/22  Viki Bean MD   tretinoin (RETIN-A) 0.025 % cream APPLY CREAM TO DRY FACE EVERY NIGHT 7/23/22   Historical Provider, MD   meloxicam (MOBIC) 15 MG tablet Take 1 tablet by mouth daily 6/10/22 7/10/22  Viki Bean MD     Allergies   Allergen Reactions    Amitriptyline Shortness Of Breath    Hydrocodone-Acetaminophen Other (See Comments)     Hallucinating  Reports only

## 2023-06-21 RX ORDER — GABAPENTIN 100 MG/1
100 CAPSULE ORAL 2 TIMES DAILY
Qty: 60 CAPSULE | Refills: 11 | Status: ON HOLD | COMMUNITY
Start: 2023-05-04 | End: 2023-06-23

## 2023-06-21 RX ORDER — METHAZOLAMIDE 25 MG/1
25 TABLET ORAL 2 TIMES DAILY
COMMUNITY
Start: 2023-05-12

## 2023-06-21 RX ORDER — SUMATRIPTAN 50 MG/1
50 TABLET, FILM COATED ORAL
COMMUNITY
Start: 2023-06-13 | End: 2023-07-13

## 2023-06-21 NOTE — PERIOP NOTE
Patient verified name and . Order for consent found in EHR and matches case posting; patient verifies procedure. Type 1A surgery, phone assessment complete. Orders received. Labs per surgeon: Hgb-A1c & POC Glucose to be collected dos. Labs per anesthesia protocol:  none needed. Patient answered medical/surgical history questions at their best of ability. All prior to admission medications documented in EPIC. Patient instructed to take the following medications the day of surgery according to anesthesia guidelines with a small sip of water: Gabapentin, Imitrex if needed. On the day before surgery please take Acetaminophen 1000mg in the morning and then again before bed. You may substitute for Tylenol 650 mg. Hold all vitamins 7 days prior to surgery and NSAIDS 5 days prior to surgery. Prescription meds to hold: none. Patient instructed on the following:    > Arrive at A Entrance, time of arrival to be called the day before by 1700  > NPO after midnight, unless otherwise indicated, including gum, mints, and ice chips  > Responsible adult must drive patient to the hospital, stay during surgery, and patient will need supervision 24 hours after anesthesia  > Use dial antibacterial soap in shower the night before surgery and on the morning of surgery  > All piercings must be removed prior to arrival.    > Leave all valuables (money and jewelry) at home but bring insurance card and ID on DOS.   > You may be required to pay a deductible or co-pay on the day of your procedure. You can pre-pay by calling [phone] if your surgery is at the [facility] or [phone] if your surgery is at the [facility]. > Do not wear make-up, nail polish, lotions, cologne, perfumes, powders, or oil on skin. Artificial nails are not permitted.

## 2023-06-22 ENCOUNTER — ANESTHESIA EVENT (OUTPATIENT)
Dept: SURGERY | Age: 48
End: 2023-06-22
Payer: COMMERCIAL

## 2023-06-23 ENCOUNTER — HOSPITAL ENCOUNTER (OUTPATIENT)
Age: 48
Setting detail: OUTPATIENT SURGERY
Discharge: HOME | End: 2023-06-23
Attending: ORTHOPAEDIC SURGERY | Admitting: ORTHOPAEDIC SURGERY
Payer: COMMERCIAL

## 2023-06-23 ENCOUNTER — ANESTHESIA (OUTPATIENT)
Dept: SURGERY | Age: 48
End: 2023-06-23
Payer: COMMERCIAL

## 2023-06-23 VITALS
WEIGHT: 252.8 LBS | HEIGHT: 64 IN | RESPIRATION RATE: 18 BRPM | TEMPERATURE: 98.6 F | OXYGEN SATURATION: 98 % | SYSTOLIC BLOOD PRESSURE: 136 MMHG | BODY MASS INDEX: 43.16 KG/M2 | DIASTOLIC BLOOD PRESSURE: 61 MMHG | HEART RATE: 79 BPM

## 2023-06-23 DIAGNOSIS — G56.01 CARPAL TUNNEL SYNDROME ON RIGHT: Primary | ICD-10-CM

## 2023-06-23 LAB
EST. AVERAGE GLUCOSE BLD GHB EST-MCNC: 117 MG/DL
GLUCOSE BLD STRIP.AUTO-MCNC: 105 MG/DL (ref 65–100)
HBA1C MFR BLD: 5.7 % (ref 4.8–5.6)
POTASSIUM BLD-SCNC: 3.7 MMOL/L (ref 3.5–5.1)
SERVICE CMNT-IMP: ABNORMAL

## 2023-06-23 PROCEDURE — 2709999900 HC NON-CHARGEABLE SUPPLY: Performed by: ORTHOPAEDIC SURGERY

## 2023-06-23 PROCEDURE — 3600000002 HC SURGERY LEVEL 2 BASE: Performed by: ORTHOPAEDIC SURGERY

## 2023-06-23 PROCEDURE — 2580000003 HC RX 258: Performed by: ANESTHESIOLOGY

## 2023-06-23 PROCEDURE — 83036 HEMOGLOBIN GLYCOSYLATED A1C: CPT

## 2023-06-23 PROCEDURE — 3700000001 HC ADD 15 MINUTES (ANESTHESIA): Performed by: ORTHOPAEDIC SURGERY

## 2023-06-23 PROCEDURE — 7100000010 HC PHASE II RECOVERY - FIRST 15 MIN: Performed by: ORTHOPAEDIC SURGERY

## 2023-06-23 PROCEDURE — 82962 GLUCOSE BLOOD TEST: CPT

## 2023-06-23 PROCEDURE — 64721 CARPAL TUNNEL SURGERY: CPT | Performed by: ORTHOPAEDIC SURGERY

## 2023-06-23 PROCEDURE — 7100000000 HC PACU RECOVERY - FIRST 15 MIN: Performed by: ORTHOPAEDIC SURGERY

## 2023-06-23 PROCEDURE — 84132 ASSAY OF SERUM POTASSIUM: CPT

## 2023-06-23 PROCEDURE — 3600000012 HC SURGERY LEVEL 2 ADDTL 15MIN: Performed by: ORTHOPAEDIC SURGERY

## 2023-06-23 PROCEDURE — 6360000002 HC RX W HCPCS: Performed by: ORTHOPAEDIC SURGERY

## 2023-06-23 PROCEDURE — 2580000003 HC RX 258: Performed by: NURSE ANESTHETIST, CERTIFIED REGISTERED

## 2023-06-23 PROCEDURE — 6360000002 HC RX W HCPCS: Performed by: ANESTHESIOLOGY

## 2023-06-23 PROCEDURE — 3700000000 HC ANESTHESIA ATTENDED CARE: Performed by: ORTHOPAEDIC SURGERY

## 2023-06-23 PROCEDURE — 2500000003 HC RX 250 WO HCPCS: Performed by: NURSE ANESTHETIST, CERTIFIED REGISTERED

## 2023-06-23 PROCEDURE — 7100000011 HC PHASE II RECOVERY - ADDTL 15 MIN: Performed by: ORTHOPAEDIC SURGERY

## 2023-06-23 PROCEDURE — 6360000002 HC RX W HCPCS: Performed by: NURSE ANESTHETIST, CERTIFIED REGISTERED

## 2023-06-23 PROCEDURE — 7100000001 HC PACU RECOVERY - ADDTL 15 MIN: Performed by: ORTHOPAEDIC SURGERY

## 2023-06-23 PROCEDURE — 6370000000 HC RX 637 (ALT 250 FOR IP): Performed by: ANESTHESIOLOGY

## 2023-06-23 RX ORDER — FENTANYL CITRATE 50 UG/ML
INJECTION, SOLUTION INTRAMUSCULAR; INTRAVENOUS PRN
Status: DISCONTINUED | OUTPATIENT
Start: 2023-06-23 | End: 2023-06-23 | Stop reason: SDUPTHER

## 2023-06-23 RX ORDER — DIPHENHYDRAMINE HYDROCHLORIDE 50 MG/ML
12.5 INJECTION INTRAMUSCULAR; INTRAVENOUS
Status: DISCONTINUED | OUTPATIENT
Start: 2023-06-23 | End: 2023-06-23 | Stop reason: HOSPADM

## 2023-06-23 RX ORDER — MIDAZOLAM HYDROCHLORIDE 2 MG/2ML
INJECTION, SOLUTION INTRAMUSCULAR; INTRAVENOUS PRN
Status: DISCONTINUED | OUTPATIENT
Start: 2023-06-23 | End: 2023-06-23 | Stop reason: SDUPTHER

## 2023-06-23 RX ORDER — SODIUM CHLORIDE 9 MG/ML
INJECTION, SOLUTION INTRAVENOUS PRN
Status: DISCONTINUED | OUTPATIENT
Start: 2023-06-23 | End: 2023-06-23 | Stop reason: HOSPADM

## 2023-06-23 RX ORDER — OXYCODONE HYDROCHLORIDE 5 MG/1
5 TABLET ORAL
Status: COMPLETED | OUTPATIENT
Start: 2023-06-23 | End: 2023-06-23

## 2023-06-23 RX ORDER — PROMETHAZINE HYDROCHLORIDE 25 MG/1
25 TABLET ORAL EVERY 6 HOURS PRN
Qty: 20 TABLET | Refills: 0 | Status: SHIPPED | OUTPATIENT
Start: 2023-06-23 | End: 2023-06-28

## 2023-06-23 RX ORDER — SODIUM CHLORIDE 0.9 % (FLUSH) 0.9 %
5-40 SYRINGE (ML) INJECTION PRN
Status: DISCONTINUED | OUTPATIENT
Start: 2023-06-23 | End: 2023-06-23 | Stop reason: HOSPADM

## 2023-06-23 RX ORDER — SODIUM CHLORIDE, SODIUM LACTATE, POTASSIUM CHLORIDE, CALCIUM CHLORIDE 600; 310; 30; 20 MG/100ML; MG/100ML; MG/100ML; MG/100ML
INJECTION, SOLUTION INTRAVENOUS CONTINUOUS
Status: DISCONTINUED | OUTPATIENT
Start: 2023-06-23 | End: 2023-06-23 | Stop reason: HOSPADM

## 2023-06-23 RX ORDER — SCOLOPAMINE TRANSDERMAL SYSTEM 1 MG/1
1 PATCH, EXTENDED RELEASE TRANSDERMAL
Status: DISCONTINUED | OUTPATIENT
Start: 2023-06-23 | End: 2023-06-23 | Stop reason: HOSPADM

## 2023-06-23 RX ORDER — SODIUM CHLORIDE 0.9 % (FLUSH) 0.9 %
5-40 SYRINGE (ML) INJECTION EVERY 12 HOURS SCHEDULED
Status: DISCONTINUED | OUTPATIENT
Start: 2023-06-23 | End: 2023-06-23 | Stop reason: HOSPADM

## 2023-06-23 RX ORDER — LIDOCAINE HYDROCHLORIDE 5 MG/ML
INJECTION, SOLUTION INFILTRATION; INTRAVENOUS PRN
Status: DISCONTINUED | OUTPATIENT
Start: 2023-06-23 | End: 2023-06-23 | Stop reason: SDUPTHER

## 2023-06-23 RX ORDER — FENTANYL CITRATE 50 UG/ML
100 INJECTION, SOLUTION INTRAMUSCULAR; INTRAVENOUS
Status: DISCONTINUED | OUTPATIENT
Start: 2023-06-23 | End: 2023-06-23 | Stop reason: HOSPADM

## 2023-06-23 RX ORDER — NALOXONE HYDROCHLORIDE 4 MG/.1ML
1 SPRAY NASAL PRN
Qty: 1 EACH | Refills: 0 | Status: SHIPPED | OUTPATIENT
Start: 2023-06-23

## 2023-06-23 RX ORDER — ONDANSETRON 2 MG/ML
4 INJECTION INTRAMUSCULAR; INTRAVENOUS
Status: COMPLETED | OUTPATIENT
Start: 2023-06-23 | End: 2023-06-23

## 2023-06-23 RX ORDER — HYDROMORPHONE HYDROCHLORIDE 2 MG/1
2 TABLET ORAL EVERY 6 HOURS PRN
Qty: 40 TABLET | Refills: 0 | Status: SHIPPED | OUTPATIENT
Start: 2023-06-23 | End: 2023-07-03

## 2023-06-23 RX ORDER — FENTANYL CITRATE 50 UG/ML
25 INJECTION, SOLUTION INTRAMUSCULAR; INTRAVENOUS EVERY 5 MIN PRN
Status: DISCONTINUED | OUTPATIENT
Start: 2023-06-23 | End: 2023-06-23 | Stop reason: HOSPADM

## 2023-06-23 RX ORDER — PROPOFOL 10 MG/ML
INJECTION, EMULSION INTRAVENOUS CONTINUOUS PRN
Status: DISCONTINUED | OUTPATIENT
Start: 2023-06-23 | End: 2023-06-23 | Stop reason: SDUPTHER

## 2023-06-23 RX ORDER — ACETAMINOPHEN 500 MG
1000 TABLET ORAL EVERY 6 HOURS PRN
COMMUNITY

## 2023-06-23 RX ORDER — METOCLOPRAMIDE HYDROCHLORIDE 5 MG/ML
10 INJECTION INTRAMUSCULAR; INTRAVENOUS
Status: DISCONTINUED | OUTPATIENT
Start: 2023-06-23 | End: 2023-06-23 | Stop reason: HOSPADM

## 2023-06-23 RX ORDER — MIDAZOLAM HYDROCHLORIDE 2 MG/2ML
2 INJECTION, SOLUTION INTRAMUSCULAR; INTRAVENOUS
Status: COMPLETED | OUTPATIENT
Start: 2023-06-23 | End: 2023-06-23

## 2023-06-23 RX ORDER — KETOROLAC TROMETHAMINE 10 MG/1
TABLET, FILM COATED ORAL
Qty: 20 TABLET | Refills: 0 | Status: SHIPPED | OUTPATIENT
Start: 2023-06-23

## 2023-06-23 RX ORDER — ROPIVACAINE HYDROCHLORIDE 5 MG/ML
INJECTION, SOLUTION EPIDURAL; INFILTRATION; PERINEURAL PRN
Status: DISCONTINUED | OUTPATIENT
Start: 2023-06-23 | End: 2023-06-23 | Stop reason: ALTCHOICE

## 2023-06-23 RX ORDER — SODIUM CHLORIDE, SODIUM LACTATE, POTASSIUM CHLORIDE, CALCIUM CHLORIDE 600; 310; 30; 20 MG/100ML; MG/100ML; MG/100ML; MG/100ML
INJECTION, SOLUTION INTRAVENOUS CONTINUOUS PRN
Status: DISCONTINUED | OUTPATIENT
Start: 2023-06-23 | End: 2023-06-23 | Stop reason: SDUPTHER

## 2023-06-23 RX ADMIN — ONDANSETRON 4 MG: 2 INJECTION INTRAMUSCULAR; INTRAVENOUS at 08:58

## 2023-06-23 RX ADMIN — MIDAZOLAM HYDROCHLORIDE 1 MG: 1 INJECTION, SOLUTION INTRAMUSCULAR; INTRAVENOUS at 08:06

## 2023-06-23 RX ADMIN — MIDAZOLAM 2 MG: 1 INJECTION INTRAMUSCULAR; INTRAVENOUS at 07:15

## 2023-06-23 RX ADMIN — LIDOCAINE HYDROCHLORIDE 250 MG: 5 INJECTION, SOLUTION INFILTRATION at 08:09

## 2023-06-23 RX ADMIN — PROPOFOL 50 MCG/KG/MIN: 10 INJECTION, EMULSION INTRAVENOUS at 08:02

## 2023-06-23 RX ADMIN — Medication 2000 MG: at 07:55

## 2023-06-23 RX ADMIN — FENTANYL CITRATE 25 MCG: 50 INJECTION, SOLUTION INTRAMUSCULAR; INTRAVENOUS at 08:12

## 2023-06-23 RX ADMIN — FENTANYL CITRATE 50 MCG: 50 INJECTION, SOLUTION INTRAMUSCULAR; INTRAVENOUS at 08:06

## 2023-06-23 RX ADMIN — OXYCODONE HYDROCHLORIDE 5 MG: 5 TABLET ORAL at 08:57

## 2023-06-23 RX ADMIN — SODIUM CHLORIDE, POTASSIUM CHLORIDE, SODIUM LACTATE AND CALCIUM CHLORIDE: 600; 310; 30; 20 INJECTION, SOLUTION INTRAVENOUS at 07:00

## 2023-06-23 RX ADMIN — SODIUM CHLORIDE, SODIUM LACTATE, POTASSIUM CHLORIDE, AND CALCIUM CHLORIDE: 600; 310; 30; 20 INJECTION, SOLUTION INTRAVENOUS at 07:54

## 2023-06-23 ASSESSMENT — PAIN SCALES - GENERAL
PAINLEVEL_OUTOF10: 2
PAINLEVEL_OUTOF10: 0
PAINLEVEL_OUTOF10: 4

## 2023-06-23 ASSESSMENT — PAIN DESCRIPTION - LOCATION
LOCATION: HAND
LOCATION: HAND

## 2023-06-23 ASSESSMENT — PAIN DESCRIPTION - DESCRIPTORS
DESCRIPTORS: ACHING
DESCRIPTORS: ACHING

## 2023-06-23 ASSESSMENT — PAIN DESCRIPTION - ORIENTATION
ORIENTATION: RIGHT
ORIENTATION: RIGHT

## 2023-06-23 ASSESSMENT — PAIN - FUNCTIONAL ASSESSMENT: PAIN_FUNCTIONAL_ASSESSMENT: 0-10

## 2023-06-23 NOTE — H&P
Update History & Physical    The patient's History and Physical of June 13, 2023 was reviewed with the patient and I examined the patient. There was no change. The surgical site was confirmed by the patient and me. Plan: The risks, benefits, expected outcome, and alternative to the recommended procedure have been discussed with the patient. Patient understands and wants to proceed with the procedure.      Electronically signed by Dong Lopez MD on 6/23/2023 at 6:08 AM

## 2023-06-23 NOTE — ANESTHESIA PRE PROCEDURE
06/21/23 0842 06/23/23 0615   BP:  128/80   Pulse:  87   Resp:  18   Temp:  97.3 °F (36.3 °C)   TempSrc:  Temporal   SpO2:  97%   Weight: 250 lb (113.4 kg) 252 lb 12.8 oz (114.7 kg)   Height: 5' 3.5\" (1.613 m) 5' 3.5\" (1.613 m)                                              BP Readings from Last 3 Encounters:   06/23/23 128/80   08/03/22 (!) 163/97       NPO Status: Time of last liquid consumption: 2100                        Time of last solid consumption: 1930                        Date of last liquid consumption: 06/22/23                        Date of last solid food consumption: 06/22/23    BMI:   Wt Readings from Last 3 Encounters:   06/23/23 252 lb 12.8 oz (114.7 kg)   05/20/23 260 lb (117.9 kg)   10/04/22 259 lb (117.5 kg)     Body mass index is 44.08 kg/m². CBC: No results found for: WBC, RBC, HGB, HCT, MCV, RDW, PLT    CMP: No results found for: NA, K, CL, CO2, BUN, CREATININE, GFRAA, AGRATIO, LABGLOM, GLUCOSE, GLU, PROT, CALCIUM, BILITOT, ALKPHOS, AST, ALT    POC Tests: No results for input(s): POCGLU, POCNA, POCK, POCCL, POCBUN, POCHEMO, POCHCT in the last 72 hours.     Coags: No results found for: PROTIME, INR, APTT    HCG (If Applicable): No results found for: PREGTESTUR, PREGSERUM, HCG, HCGQUANT     ABGs: No results found for: PHART, PO2ART, EEH3ERO, CYW3FCX, BEART, T1KWJYIX     Type & Screen (If Applicable):  No results found for: LABABO, LABRH    Drug/Infectious Status (If Applicable):  No results found for: HIV, HEPCAB    COVID-19 Screening (If Applicable): No results found for: COVID19        Anesthesia Evaluation  Patient summary reviewed and Nursing notes reviewed  Airway: Mallampati: II  TM distance: >3 FB   Neck ROM: full     Dental:          Pulmonary:Negative Pulmonary ROS and normal exam    (+) sleep apnea:                             Cardiovascular:  Exercise tolerance: good (>4 METS),   (+) hypertension:,         Rhythm: regular  Rate: normal                    Neuro/Psych:   Negative

## 2023-06-23 NOTE — DISCHARGE INSTRUCTIONS
Neto Hamilton Grade [phone]    ACTIVITY   As tolerated and as directed by your doctor   Elevate surgery site first 48 hours. Bathe or shower as directed by your doctor. DIET   Clear liquids until no nausea or vomiting; then light diet for the first day   Advance to regular diet on second day, unless your doctor orders otherwise. If nausea and vomiting continues, call your doctor. PAIN   Take pain medication as directed by your doctor. Call your doctor if pain is NOT relieved by medication. DO NOT take aspirin or blood thinners until directed by your doctor. DRESSING CARE: Follow all dressing care instructions provided by Dr. Bethany Alvarez from Dr Jasmyn Lazar office will call tomorrow with further instructions. If you have any problems or concerns, call your doctor as needed. CALL YOUR DOCTOR IF   Excessive bleeding that does not stop after holding mild pressure over the area   Temperature of 101°F or above   Redness, excessive swelling or bruising, and/or green or yellow, smelly discharge from incision    Cryo Cuff or Iceman 24-48 hours continuously    MEDICATION INTERACTION:    During your procedure you potentially received a medication or medications which may reduce the effectiveness of oral contraceptives. Please consider other forms of contraception for 1 month following your procedure if you are currently using oral contraceptives as your primary form of birth control. In addition to this, we recommend continuing your oral contraceptive as prescribed, unless otherwise instructed by your physician, during this time.     After general anesthesia or intravenous sedation, for 24 hours or while taking prescription Narcotics:  Limit your activities  A responsible adult needs to be with you for the next 24 hours  Do not drive and operate hazardous machinery  Do not make important personal or business decisions  Do not drink alcoholic beverages  If

## 2023-06-23 NOTE — ANESTHESIA POSTPROCEDURE EVALUATION
Department of Anesthesiology  Postprocedure Note    Patient: Guille Roberts  MRN: [id number]  YOB: 1975  Date of evaluation: 6/23/2023      Procedure Summary     Date: 06/23/23 [place] [facility] [place] [facility] [place] 05 / [facility] [place]    Anesthesia Start: 9523 Anesthesia Stop: 4590    Procedure: CARPAL TUNNEL RELEASE RIGHT WRIST (Right: Hand) Diagnosis:       Carpal tunnel syndrome on right      (Carpal tunnel syndrome on right [G56.01])    Surgeons: Juan Diego Melton MD Responsible Provider: Reg Leung MD    Anesthesia Type: TIVA ASA Status: 2          Anesthesia Type: No value filed.     Ellis Phase I: Ellis Score: 10    Ellis Phase II:        Anesthesia Post Evaluation    Patient location during evaluation: PACU  Patient participation: complete - patient participated  Level of consciousness: awake and awake and alert  Airway patency: patent  Nausea & Vomiting: no nausea  Complications: no  Cardiovascular status: hemodynamically stable  Respiratory status: acceptable  Hydration status: euvolemic  Multimodal analgesia pain management approach

## 2023-06-23 NOTE — OP NOTE
[place]  OPERATIVE REPORT    Name:  Joann Clark  MR#:  [id number]  :  1975  ACCOUNT #:  [de-identified]  DATE OF SERVICE:  2023    PREOPERATIVE DIAGNOSIS:  Carpal tunnel syndrome, right wrist.    POSTOPERATIVE DIAGNOSIS:  Carpal tunnel syndrome, right wrist.    PROCEDURE PERFORMED:  Carpal tunnel release, right wrist.    SURGEON:  Kaylee Mckenna. Adina Rosario MD        ANESTHESIA:  Alli block anesthesia. COMPLICATIONS:  None. ESTIMATED BLOOD LOSS:  5 mL. FINDINGS:  Carpal tunnel syndrome. CPT CODE:  60450. ICD-10 CODE:  G56.01.    TOURNIQUET TIME:  21 minutes. INDICATIONS:  The patient is a 40-year-old female who has developed recalcitrant right hand numbness and tingling. Preoperative physical exam including EMG/nerve conduction studies demonstrate carpal tunnel syndrome, right wrist.  She is now electively admitted for operative release. PROCEDURE:  Following identification of the patient, the patient was taken to the operative suite. Following induction of Alli block anesthesia, the right arm was prepped and draped in the sterile fashion. A 3 cm incision extending from the volar wrist crease distally was performed. Skin was incised. Subcutaneous tissue was then dissected down to the transverse carpal ligament. Median nerve was identified. It was markedly compressed. The nerve was erythematous. The median nerve was protected. The transverse carpal ligament was released. Once an adequate release of the median nerve was completed, the floor of the carpal tunnel was inspected. There was no evidence of loose body or foreign body. The recurrent branch of the median nerve was intact. At this point, the wound was then irrigated and closed with 3-0 nylon horizontal mattress sutures. The wound was injected with 10 mL of Naropin without epinephrine. A sterile dressing was applied. The tourniquet was released. Total tourniquet time including [place] block time was 21 minutes.

## 2023-06-23 NOTE — BRIEF OP NOTE
BRIEF OPERATIVE NOTE    Date of Procedure: 6/23/2023     Preoperative Diagnosis:  CARPAL TUNNEL SYNDROME RIGHT WRIST    Postoperative Diagnosis:  SAME    Procedure(s)  CARPAL TUNNEL RELEASE RIGHT WRIST    Surgeon(s) and Role:     * Naina Carmona MD - Primary         Assistant Staff:  NONE    Surgical Staff:  Circulator: Jax Singer RN  Surgical Assistant: Humphrey Lyons  Scrub Person First: Thera Massed  Scrub Person Second: Kd Cook      * Missing procedure start or end time(s) *     Anesthesia:  JENNIFER BLOCK    Estimated Blood Loss: 5 CC.     Complications: NONE      TOURNIQUET:  21 Donato Hendrix MD Observation and assessment

## 2023-06-23 NOTE — ANESTHESIA POSTPROCEDURE EVALUATION
Department of Anesthesiology  Postprocedure Note    Patient: Pedro Levin  MRN: [id number]  YOB: 1975  Date of evaluation: 6/23/2023      Procedure Summary     Date: 06/23/23 Room / Location: [facility] [place] MA[facility] OR 05 / [facility] [place] [facility] OR    Anesthesia Start: 6373 Anesthesia Stop: 0958    Procedure: CARPAL TUNNEL RELEASE RIGHT WRIST (Right: Hand) Diagnosis:       Carpal tunnel syndrome on right      (Carpal tunnel syndrome on right [G56.01])    Surgeons: Amy Urena MD Responsible Provider: Essence Coulter MD    Anesthesia Type: TIVA ASA Status: 2          Anesthesia Type: No value filed.     Ellis Phase I: Ellis Score: 10    Ellis Phase II:        Anesthesia Post Evaluation    Patient location during evaluation: PACU  Patient participation: complete - patient participated  Level of consciousness: awake and awake and alert  Airway patency: patent  Nausea & Vomiting: no nausea  Complications: no  Cardiovascular status: hemodynamically stable  Respiratory status: acceptable  Hydration status: euvolemic  Multimodal analgesia pain management approach

## 2023-06-24 ENCOUNTER — TELEPHONE (OUTPATIENT)
Dept: ORTHOPEDIC SURGERY | Age: 48
End: 2023-06-24

## 2023-06-24 NOTE — TELEPHONE ENCOUNTER
Called and spoke with patient. Went over post op instructions. Made sure patient had post op appointment. Patient will call with any questions or needs.

## 2023-07-03 NOTE — PROGRESS NOTES
Today: 7/3/23    Name: Horacio Velazquez  : 1975  MRN: [id number]    Patient comes in today for their post op appointment. She is 12 days status post Carpal tunnel release, right wrist. She comes in doing good. Bandage was removed from right wrist, incision healing nicely. Sutures were removed, incision intact. Patient did not need any refills. Patient will return in 2 weeks. Patient was only seen by Mor Costello CST to maintain post operative protocol, all orders for this visit were received verbally by Dr. Shannan Aguero prior to appointment.

## 2023-07-05 ENCOUNTER — OFFICE VISIT (OUTPATIENT)
Dept: ORTHOPEDIC SURGERY | Age: 48
End: 2023-07-05

## 2023-07-05 DIAGNOSIS — Z48.89 ENCOUNTER FOR POSTOPERATIVE CARE: Primary | ICD-10-CM

## 2023-07-19 ENCOUNTER — OFFICE VISIT (OUTPATIENT)
Dept: ORTHOPEDIC SURGERY | Age: 48
End: 2023-07-19

## 2023-07-19 DIAGNOSIS — Z48.89 ENCOUNTER FOR POSTOPERATIVE CARE: Primary | ICD-10-CM

## 2023-07-19 PROCEDURE — 99024 POSTOP FOLLOW-UP VISIT: CPT | Performed by: ORTHOPAEDIC SURGERY

## 2023-07-19 NOTE — PROGRESS NOTES
Name: Cecy Colby  YOB: 1975  Gender: female  MRN: [id number]          HPI: Cecy Colby is a 50 y.o. right-hand-dominant female 1 month status post carpal tunnel release right wrist.  She returns noting that she is doing well. Some tingling. ROS/Meds/PSH/PMH/FH/SH: A ten system review of systems was performed and is negative other than what is in the HPI. Tobacco:  reports that she has never smoked. She has never used smokeless tobacco.  LMP 06/03/2023 (Approximate)      Physical Examination:  She is awake alert pleasant female ambulating without difficulty  She has a full range of cervical spine motion without radicular findings    The left shoulder has 0 to 180 degrees of active and 0 to 180 degrees passive forward elevation. Internal rotation is to T6. External rotation is to 60 degrees at the side. In the 90 degree abducted position 90 degrees of external and 90 degrees internal rotation  The AC joint is non-tender  SC joint is non-tender. Greater tuberosity is non-tender. negative biceps  Negative O'Briens sign  negative lift-off sign  Negative belly press sign  Negative bear huggers sign  negative drop sign  negative hornblower's sign  No posterior glenohumeral joint line tenderness. No evident excessive external rotation  Rotator cuff strength is 5/5.  negative external rotation stress test.   Negative empty can sign  There is no evident anterior or posterior apprehension with a negative sulcus sign. No instability  negative external and internal Rotation lag sign  Neurovascularly intact. Positive Tinel and Phalen's at the left carpal tunnel  Otherwise neurovascularly intact  She has a full range of motion in her wrist hands and digits    The right shoulder has 0 to 180 degrees of active and 0 to 180 degrees passive forward elevation. Pain in the overhead position  Internal rotation is to T6. External rotation is to 60 degrees at the side.    In the 90 degree

## 2023-08-21 ENCOUNTER — OFFICE VISIT (OUTPATIENT)
Dept: ORTHOPEDIC SURGERY | Age: 48
End: 2023-08-21

## 2023-08-21 DIAGNOSIS — M19.011 DEGENERATIVE JOINT DISEASE OF RIGHT ACROMIOCLAVICULAR JOINT: ICD-10-CM

## 2023-08-21 DIAGNOSIS — Z48.89 ENCOUNTER FOR POSTOPERATIVE CARE: Primary | ICD-10-CM

## 2023-08-21 DIAGNOSIS — S43.431D SUPERIOR GLENOID LABRUM LESION OF RIGHT SHOULDER, SUBSEQUENT ENCOUNTER: ICD-10-CM

## 2023-08-21 DIAGNOSIS — M75.21 BICIPITAL TENDINITIS OF RIGHT SHOULDER: ICD-10-CM

## 2023-08-21 DIAGNOSIS — M75.121 NONTRAUMATIC COMPLETE TEAR OF RIGHT ROTATOR CUFF: ICD-10-CM

## 2023-08-21 DIAGNOSIS — M19.011 OSTEOARTHRITIS OF RIGHT GLENOHUMERAL JOINT: ICD-10-CM

## 2023-08-21 PROCEDURE — 99024 POSTOP FOLLOW-UP VISIT: CPT | Performed by: ORTHOPAEDIC SURGERY

## 2023-08-21 NOTE — PROGRESS NOTES
Name: Kuldip Plascencia  YOB: 1975  Gender: female  MRN: [id number]          HPI: Kuldip Plascencia is a 50 y.o. right-hand-dominant female 2 months status post carpal tunnel release right wrist.  She returns noting that she is doing well. Her last right shoulder injection was 6/13/2023      ROS/Meds/PSH/PMH/FH/SH: A ten system review of systems was performed and is negative other than what is in the HPI. Tobacco:  reports that she has never smoked. She has never used smokeless tobacco.  There were no vitals taken for this visit. Physical Examination:  She is awake alert pleasant female ambulating without difficulty  She has a full range of cervical spine motion without radicular findings    The left shoulder has 0 to 180 degrees of active and 0 to 180 degrees passive forward elevation. Internal rotation is to T6. External rotation is to 60 degrees at the side. In the 90 degree abducted position 90 degrees of external and 90 degrees internal rotation  The AC joint is non-tender  SC joint is non-tender. Greater tuberosity is non-tender. negative biceps  Negative O'Briens sign  negative lift-off sign  Negative belly press sign  Negative bear huggers sign  negative drop sign  negative hornblower's sign  No posterior glenohumeral joint line tenderness. No evident excessive external rotation  Rotator cuff strength is 5/5.  negative external rotation stress test.   Negative empty can sign  There is no evident anterior or posterior apprehension with a negative sulcus sign. No instability  negative external and internal Rotation lag sign  Neurovascularly intact. Positive Tinel and Phalen's at the left carpal tunnel  Otherwise neurovascularly intact  She has a full range of motion in her wrist hands and digits    The right shoulder has 0 to 180 degrees of active and 0 to 180 degrees passive forward elevation. Pain in the overhead position  Internal rotation is to T6.   External rotation is to

## 2023-10-02 ENCOUNTER — OFFICE VISIT (OUTPATIENT)
Dept: ORTHOPEDIC SURGERY | Age: 48
End: 2023-10-02

## 2023-10-02 DIAGNOSIS — M75.121 NONTRAUMATIC COMPLETE TEAR OF RIGHT ROTATOR CUFF: ICD-10-CM

## 2023-10-02 DIAGNOSIS — S43.431D SUPERIOR GLENOID LABRUM LESION OF RIGHT SHOULDER, SUBSEQUENT ENCOUNTER: ICD-10-CM

## 2023-10-02 DIAGNOSIS — M75.21 BICIPITAL TENDINITIS OF RIGHT SHOULDER: ICD-10-CM

## 2023-10-02 DIAGNOSIS — M19.011 OSTEOARTHRITIS OF RIGHT GLENOHUMERAL JOINT: Primary | ICD-10-CM

## 2023-10-02 DIAGNOSIS — M19.011 DEGENERATIVE JOINT DISEASE OF RIGHT ACROMIOCLAVICULAR JOINT: ICD-10-CM

## 2023-10-02 NOTE — PROGRESS NOTES
Name: Valentino Mabry  YOB: 1975  Gender: female  MRN: [id number]          HPI: Valentino Mabry is a 50 y.o. right-hand-dominant female 3.5 months status post carpal tunnel release right wrist.  She returns noting that she is doing well. Her last right shoulder injection was 6/13/2023. She is here for another right shoulder injection      ROS/Meds/PSH/PMH/FH/SH: A ten system review of systems was performed and is negative other than what is in the HPI. Tobacco:  reports that she has never smoked. She has never used smokeless tobacco.  There were no vitals taken for this visit. Physical Examination:  She is awake alert pleasant female ambulating without difficulty  She has a full range of cervical spine motion without radicular findings    The left shoulder has 0 to 180 degrees of active and 0 to 180 degrees passive forward elevation. Internal rotation is to T6. External rotation is to 60 degrees at the side. In the 90 degree abducted position 90 degrees of external and 90 degrees internal rotation  The AC joint is non-tender  SC joint is non-tender. Greater tuberosity is non-tender. negative biceps  Negative O'Briens sign  negative lift-off sign  Negative belly press sign  Negative bear huggers sign  negative drop sign  negative hornblower's sign  No posterior glenohumeral joint line tenderness. No evident excessive external rotation  Rotator cuff strength is 5/5.  negative external rotation stress test.   Negative empty can sign  There is no evident anterior or posterior apprehension with a negative sulcus sign. No instability  negative external and internal Rotation lag sign  Neurovascularly intact. Positive Tinel and Phalen's at the left carpal tunnel  Otherwise neurovascularly intact  She has a full range of motion in her wrist hands and digits    The right shoulder has 0 to 180 degrees of active and 0 to 180 degrees passive forward elevation.    Pain in the overhead

## 2024-01-15 ENCOUNTER — OFFICE VISIT (OUTPATIENT)
Dept: ORTHOPEDIC SURGERY | Age: 49
End: 2024-01-15
Payer: COMMERCIAL

## 2024-01-15 DIAGNOSIS — M19.011 OSTEOARTHRITIS OF RIGHT GLENOHUMERAL JOINT: Primary | ICD-10-CM

## 2024-01-15 DIAGNOSIS — M75.21 BICIPITAL TENDINITIS OF RIGHT SHOULDER: ICD-10-CM

## 2024-01-15 DIAGNOSIS — S43.431D SUPERIOR GLENOID LABRUM LESION OF RIGHT SHOULDER, SUBSEQUENT ENCOUNTER: ICD-10-CM

## 2024-01-15 DIAGNOSIS — M19.011 DEGENERATIVE JOINT DISEASE OF RIGHT ACROMIOCLAVICULAR JOINT: ICD-10-CM

## 2024-01-15 DIAGNOSIS — M75.121 NONTRAUMATIC COMPLETE TEAR OF RIGHT ROTATOR CUFF: ICD-10-CM

## 2024-01-15 PROCEDURE — 20610 DRAIN/INJ JOINT/BURSA W/O US: CPT | Performed by: ORTHOPAEDIC SURGERY

## 2024-01-15 RX ORDER — METHYLPREDNISOLONE ACETATE 80 MG/ML
80 INJECTION, SUSPENSION INTRA-ARTICULAR; INTRALESIONAL; INTRAMUSCULAR; SOFT TISSUE ONCE
Status: COMPLETED | OUTPATIENT
Start: 2024-01-15 | End: 2024-01-15

## 2024-01-15 RX ADMIN — METHYLPREDNISOLONE ACETATE 80 MG: 80 INJECTION, SUSPENSION INTRA-ARTICULAR; INTRALESIONAL; INTRAMUSCULAR; SOFT TISSUE at 17:18

## 2024-01-15 NOTE — PROGRESS NOTES
Name: Saima Perez  YOB: 1975  Gender: female  MRN: [id number]          HPI: Saima Perez is a 48 y.o. right-hand-dominant female 6.5 months status post carpal tunnel release right wrist.  She returns noting that she is doing well.  Her last right shoulder injection was 6/13/2023.  She is here for another right shoulder injection      ROS/Meds/PSH/PMH/FH/SH: A ten system review of systems was performed and is negative other than what is in the HPI.   Tobacco:  reports that she has never smoked. She has never used smokeless tobacco.  There were no vitals taken for this visit.     Physical Examination:  She is awake alert pleasant female ambulating without difficulty  She has a full range of cervical spine motion without radicular findings    The left shoulder has 0 to 180 degrees of active and 0 to 180 degrees passive forward elevation.   Internal rotation is to T6.  External rotation is to 60 degrees at the side.   In the 90 degree abducted position 90 degrees of external and 90 degrees internal rotation  The AC joint is non-tender  SC joint is non-tender.   Greater tuberosity is non-tender.  negative biceps  Negative O'Briens sign  negative lift-off sign  Negative belly press sign  Negative bear huggers sign  negative drop sign  negative hornblower's sign  No posterior glenohumeral joint line tenderness.   No evident excessive external rotation  Rotator cuff strength is 5/5.  negative external rotation stress test.   Negative empty can sign  There is no evident anterior or posterior apprehension with a negative sulcus sign.   No instability  negative external and internal Rotation lag sign  Neurovascularly intact.    Positive Tinel and Phalen's at the left carpal tunnel  Otherwise neurovascularly intact  She has a full range of motion in her wrist hands and digits    The right shoulder has 0 to 180 degrees of active and 0 to 180 degrees passive forward elevation.   Pain in the overhead

## 2024-04-15 ENCOUNTER — OFFICE VISIT (OUTPATIENT)
Dept: ORTHOPEDIC SURGERY | Age: 49
End: 2024-04-15

## 2024-04-15 DIAGNOSIS — S43.431D SUPERIOR GLENOID LABRUM LESION OF RIGHT SHOULDER, SUBSEQUENT ENCOUNTER: ICD-10-CM

## 2024-04-15 DIAGNOSIS — M19.011 OSTEOARTHRITIS OF RIGHT GLENOHUMERAL JOINT: Primary | ICD-10-CM

## 2024-04-15 DIAGNOSIS — M19.011 DEGENERATIVE JOINT DISEASE OF RIGHT ACROMIOCLAVICULAR JOINT: ICD-10-CM

## 2024-04-15 DIAGNOSIS — M75.121 NONTRAUMATIC COMPLETE TEAR OF RIGHT ROTATOR CUFF: ICD-10-CM

## 2024-04-15 DIAGNOSIS — M75.21 BICIPITAL TENDINITIS OF RIGHT SHOULDER: ICD-10-CM

## 2024-04-15 NOTE — PROGRESS NOTES
Name: Saima Perez  YOB: 1975  Gender: female  MRN: [id number]          HPI: Saima Perez is a 48 y.o. right-hand-dominant female 6.5 months status post carpal tunnel release right wrist.  She returns noting that she is doing well.  Her last right shoulder injection was 6/13/2023.  She is here for another right shoulder injection      ROS/Meds/PSH/PMH/FH/SH: A ten system review of systems was performed and is negative other than what is in the HPI.   Tobacco:  reports that she has never smoked. She has never used smokeless tobacco.  There were no vitals taken for this visit.     Physical Examination:  She is awake alert pleasant female ambulating without difficulty  She has a full range of cervical spine motion without radicular findings    The left shoulder has 0 to 180 degrees of active and 0 to 180 degrees passive forward elevation.   Internal rotation is to T6.  External rotation is to 60 degrees at the side.   In the 90 degree abducted position 90 degrees of external and 90 degrees internal rotation  The AC joint is non-tender  SC joint is non-tender.   Greater tuberosity is non-tender.  negative biceps  Negative O'Briens sign  negative lift-off sign  Negative belly press sign  Negative bear huggers sign  negative drop sign  negative hornblower's sign  No posterior glenohumeral joint line tenderness.   No evident excessive external rotation  Rotator cuff strength is 5/5.  negative external rotation stress test.   Negative empty can sign  There is no evident anterior or posterior apprehension with a negative sulcus sign.   No instability  negative external and internal Rotation lag sign  Neurovascularly intact.    Positive Tinel and Phalen's at the left carpal tunnel  Otherwise neurovascularly intact  She has a full range of motion in her wrist hands and digits    The right shoulder has 0 to 180 degrees of active and 0 to 180 degrees passive forward elevation.   Pain in the overhead

## 2024-07-15 ENCOUNTER — OFFICE VISIT (OUTPATIENT)
Dept: ORTHOPEDIC SURGERY | Age: 49
End: 2024-07-15
Payer: COMMERCIAL

## 2024-07-15 DIAGNOSIS — M19.011 OSTEOARTHRITIS OF RIGHT GLENOHUMERAL JOINT: Primary | ICD-10-CM

## 2024-07-15 DIAGNOSIS — M75.121 NONTRAUMATIC COMPLETE TEAR OF RIGHT ROTATOR CUFF: ICD-10-CM

## 2024-07-15 DIAGNOSIS — M75.21 BICIPITAL TENDINITIS OF RIGHT SHOULDER: ICD-10-CM

## 2024-07-15 DIAGNOSIS — S43.431D SUPERIOR GLENOID LABRUM LESION OF RIGHT SHOULDER, SUBSEQUENT ENCOUNTER: ICD-10-CM

## 2024-07-15 PROCEDURE — 20610 DRAIN/INJ JOINT/BURSA W/O US: CPT | Performed by: ORTHOPAEDIC SURGERY

## 2024-07-15 RX ORDER — METHYLPREDNISOLONE ACETATE 80 MG/ML
80 INJECTION, SUSPENSION INTRA-ARTICULAR; INTRALESIONAL; INTRAMUSCULAR; SOFT TISSUE ONCE
Status: COMPLETED | OUTPATIENT
Start: 2024-07-15 | End: 2024-07-15

## 2024-07-15 RX ADMIN — METHYLPREDNISOLONE ACETATE 80 MG: 80 INJECTION, SUSPENSION INTRA-ARTICULAR; INTRALESIONAL; INTRAMUSCULAR; SOFT TISSUE at 16:25

## 2024-07-15 NOTE — PROGRESS NOTES
Name: Saima Perez  YOB: 1975  Gender: female  MRN: [id number]          HPI: Saima Perez is a 49 y.o. right-hand-dominant female 12.5 months status post carpal tunnel release right wrist.  She returns noting that she is doing well.  Her last right shoulder injection was 4/15/2023.  She is here for another right shoulder injection      ROS/Meds/PSH/PMH/FH/SH: A ten system review of systems was performed and is negative other than what is in the HPI.   Tobacco:  reports that she has never smoked. She has never used smokeless tobacco.  There were no vitals taken for this visit.     Physical Examination:  She is awake alert pleasant female ambulating without difficulty  She has a full range of cervical spine motion without radicular findings    The left shoulder has 0 to 180 degrees of active and 0 to 180 degrees passive forward elevation.   Internal rotation is to T6.  External rotation is to 60 degrees at the side.   In the 90 degree abducted position 90 degrees of external and 90 degrees internal rotation  The AC joint is non-tender  SC joint is non-tender.   Greater tuberosity is non-tender.  negative biceps  Negative O'Briens sign  negative lift-off sign  Negative belly press sign  Negative bear huggers sign  negative drop sign  negative hornblower's sign  No posterior glenohumeral joint line tenderness.   No evident excessive external rotation  Rotator cuff strength is 5/5.  negative external rotation stress test.   Negative empty can sign  There is no evident anterior or posterior apprehension with a negative sulcus sign.   No instability  negative external and internal Rotation lag sign  Neurovascularly intact.    Positive Tinel and Phalen's at the left carpal tunnel  Otherwise neurovascularly intact  She has a full range of motion in her wrist hands and digits    The right shoulder has 0 to 180 degrees of active and 0 to 180 degrees passive forward elevation.   Pain in the overhead

## 2024-11-18 ENCOUNTER — OFFICE VISIT (OUTPATIENT)
Age: 49
End: 2024-11-18

## 2024-11-18 DIAGNOSIS — M75.121 NONTRAUMATIC COMPLETE TEAR OF RIGHT ROTATOR CUFF: ICD-10-CM

## 2024-11-18 DIAGNOSIS — M19.011 DEGENERATIVE JOINT DISEASE OF RIGHT ACROMIOCLAVICULAR JOINT: ICD-10-CM

## 2024-11-18 DIAGNOSIS — M75.21 BICIPITAL TENDINITIS OF RIGHT SHOULDER: ICD-10-CM

## 2024-11-18 DIAGNOSIS — M19.011 OSTEOARTHRITIS OF RIGHT GLENOHUMERAL JOINT: Primary | ICD-10-CM

## 2024-11-18 DIAGNOSIS — S43.431D SUPERIOR GLENOID LABRUM LESION OF RIGHT SHOULDER, SUBSEQUENT ENCOUNTER: ICD-10-CM

## 2024-11-18 NOTE — PROGRESS NOTES
Name: Saima Perez  YOB: 1975  Gender: female  MRN: [id number]          HPI: Saima Perez is a 49 y.o. right-hand-dominant female 17  months status post carpal tunnel release right wrist.  She returns noting that she is doing well.  Her last right shoulder injection was 4/15/2023.  She is here for another right shoulder injection      ROS/Meds/PSH/PMH/FH/SH: A ten system review of systems was performed and is negative other than what is in the HPI.   Tobacco:  reports that she has never smoked. She has never used smokeless tobacco.  There were no vitals taken for this visit.     Physical Examination:  She is awake alert pleasant female ambulating without difficulty  She has a full range of cervical spine motion without radicular findings    The left shoulder has 0 to 180 degrees of active and 0 to 180 degrees passive forward elevation.   Internal rotation is to T6.  External rotation is to 60 degrees at the side.   In the 90 degree abducted position 90 degrees of external and 90 degrees internal rotation  The AC joint is non-tender  SC joint is non-tender.   Greater tuberosity is non-tender.  negative biceps  Negative O'Briens sign  negative lift-off sign  Negative belly press sign  Negative bear huggers sign  negative drop sign  negative hornblower's sign  No posterior glenohumeral joint line tenderness.   No evident excessive external rotation  Rotator cuff strength is 5/5.  negative external rotation stress test.   Negative empty can sign  There is no evident anterior or posterior apprehension with a negative sulcus sign.   No instability  negative external and internal Rotation lag sign  Neurovascularly intact.    Positive Tinel and Phalen's at the left carpal tunnel  Otherwise neurovascularly intact  She has a full range of motion in her wrist hands and digits    The right shoulder has 0 to 180 degrees of active and 0 to 180 degrees passive forward elevation.   Pain in the overhead

## 2025-04-07 ENCOUNTER — OFFICE VISIT (OUTPATIENT)
Dept: ORTHOPEDIC SURGERY | Age: 50
End: 2025-04-07
Payer: COMMERCIAL

## 2025-04-07 VITALS — HEIGHT: 63 IN | BODY MASS INDEX: 44.65 KG/M2 | WEIGHT: 252 LBS

## 2025-04-07 DIAGNOSIS — M75.52 SUBACROMIAL BURSITIS OF LEFT SHOULDER JOINT: ICD-10-CM

## 2025-04-07 DIAGNOSIS — M25.512 LEFT SHOULDER PAIN, UNSPECIFIED CHRONICITY: ICD-10-CM

## 2025-04-07 DIAGNOSIS — M75.42 IMPINGEMENT SYNDROME OF LEFT SHOULDER: ICD-10-CM

## 2025-04-07 DIAGNOSIS — M25.522 LEFT ELBOW PAIN: Primary | ICD-10-CM

## 2025-04-07 PROCEDURE — 99214 OFFICE O/P EST MOD 30 MIN: CPT | Performed by: PHYSICIAN ASSISTANT

## 2025-04-07 PROCEDURE — 20610 DRAIN/INJ JOINT/BURSA W/O US: CPT | Performed by: PHYSICIAN ASSISTANT

## 2025-04-07 RX ORDER — METHYLPREDNISOLONE ACETATE 80 MG/ML
80 INJECTION, SUSPENSION INTRA-ARTICULAR; INTRALESIONAL; INTRAMUSCULAR; SOFT TISSUE ONCE
Status: COMPLETED | OUTPATIENT
Start: 2025-04-07 | End: 2025-04-07

## 2025-04-07 RX ADMIN — METHYLPREDNISOLONE ACETATE 80 MG: 80 INJECTION, SUSPENSION INTRA-ARTICULAR; INTRALESIONAL; INTRAMUSCULAR; SOFT TISSUE at 12:36

## 2025-04-07 NOTE — PROGRESS NOTES
the area was prepped and cleansed in sterile fashion with chlorhexidine and alcohol a solution of 4.5cc of 2% xylocaine, 4.5cc of 0.5% bupivacaine and 1cc of 80mg of depomedrol was injected into the  *Left Subacromial space   .     How tolerated by patient: tolerated the procedure well with no complications    Post injection instructions were given to Saima Perez:           Time spent in preparation, chart review, and direct patient care was 32 minutes    Electronically signed by:   CARLOS Solitario  4/7/2025,  12:05 PM

## 2025-04-10 ENCOUNTER — OFFICE VISIT (OUTPATIENT)
Dept: ORTHOPEDIC SURGERY | Age: 50
End: 2025-04-10

## 2025-04-10 DIAGNOSIS — M12.812 ROTATOR CUFF ARTHROPATHY OF LEFT SHOULDER: ICD-10-CM

## 2025-04-10 DIAGNOSIS — M19.011 OSTEOARTHRITIS OF RIGHT GLENOHUMERAL JOINT: ICD-10-CM

## 2025-04-10 DIAGNOSIS — S43.431D SUPERIOR GLENOID LABRUM LESION OF RIGHT SHOULDER, SUBSEQUENT ENCOUNTER: ICD-10-CM

## 2025-04-10 DIAGNOSIS — M67.88 BICEPS TENDINOSIS OF LEFT UPPER EXTREMITY: ICD-10-CM

## 2025-04-10 DIAGNOSIS — M75.52 SUBACROMIAL BURSITIS OF LEFT SHOULDER JOINT: ICD-10-CM

## 2025-04-10 DIAGNOSIS — M25.522 LEFT ELBOW PAIN: ICD-10-CM

## 2025-04-10 DIAGNOSIS — M12.811 ROTATOR CUFF ARTHROPATHY OF RIGHT SHOULDER: Primary | ICD-10-CM

## 2025-04-10 RX ORDER — METHYLPREDNISOLONE ACETATE 80 MG/ML
80 INJECTION, SUSPENSION INTRA-ARTICULAR; INTRALESIONAL; INTRAMUSCULAR; SOFT TISSUE ONCE
Status: DISCONTINUED | OUTPATIENT
Start: 2025-04-10 | End: 2025-04-10

## 2025-04-10 RX ADMIN — METHYLPREDNISOLONE ACETATE 80 MG: 80 INJECTION, SUSPENSION INTRA-ARTICULAR; INTRALESIONAL; INTRAMUSCULAR; SOFT TISSUE at 15:46

## 2025-04-10 NOTE — PROGRESS NOTES
[facility]          Patient ID:  Name: Saima Perez  AGE/Gender: 49 y.o. female  MRN: [id number]  : 1975    Date of Consultation:  April 10, 2025          ALLERGIES:   Allergies   Allergen Reactions    Amitriptyline Shortness Of Breath    Trazodone         CC: Recheck left shoulder, steroid injection right shoulder    History:  The patient presents today for recheck of the left shoulder and discussion of MRI results.  She is also here today for right shoulder injection.. The patient has been treated by Dr. Spear for the right shoulder and is here today for repeat steroid injection of the right shoulder.  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  Left shoulder is doing about the same.  She has not received much relief with the steroid injection she received yet but this is not unusual for her as it takes several days in the past to notice a difference.  Her left arm pain is essentially unchanged.  She had a prior ultrasound that demonstrated concerns for distal biceps tendinosis or partial tearing.  She is stopped doing transfers with her mom which was one of the aggravating factors.  They deny any new injuries.  They have no other complaints or concerns.      Review of Systems:  Pertinent items are noted in HPI.    Past Medical History Includes:   Past Medical History:   Diagnosis Date    BMI 40.0-44.9, adult     [place]us[place] of eye     Eczema     Hypertension     on med for control    Intracranial hypertension     followed by Neuro    Migraines     Snores     possible JOS- having sleep study 2023   ,   Past Surgical History:   Procedure Laterality Date    CARPAL TUNNEL RELEASE Right 2023    CARPAL TUNNEL RELEASE RIGHT WRIST performed by Parveen Spear Jr., MD at [facility] [place] [facility] [place]    COLONOSCOPY      TUBAL LIGATION         Family History: No family history on file.     Social History:   Social

## (undated) DEVICE — DISPOSABLE TOURNIQUET CUFF SINGLE BLADDER, DUAL PORT AND LUER LOCK CONNECTOR: Brand: COLOR CUFF

## (undated) DEVICE — DRAPE,U/SHT,SPLIT,FILM,60X84,STERILE: Brand: MEDLINE

## (undated) DEVICE — BANDAGE COBAN 4 IN COMPR W4INXL5YD FOAM COHESIVE QUIK STK SELF ADH SFT

## (undated) DEVICE — CARDINAL HEALTH FLEXIBLE LIGHT HANDLE COVER: Brand: CARDINAL HEALTH

## (undated) DEVICE — AMD ANTIMICROBIAL GAUZE SPONGES,12 PLY USP TYPE VII, 0.2% POLYHEXAMETHYLENE BIGUANIDE HCI (PHMB): Brand: CURITY

## (undated) DEVICE — PADDING,UNDERCAST,COTTON, 4"X4YD STERILE: Brand: MEDLINE

## (undated) DEVICE — Device

## (undated) DEVICE — APPLICATOR MEDICATED 26 CC SOLUTION HI LT ORNG CHLORAPREP

## (undated) DEVICE — OCCLUSIVE GAUZE STRIP,3% BISMUTH TRIBROMOPHENATE IN PETROLATUM BLEND: Brand: XEROFORM

## (undated) DEVICE — STOCKINETTE,DOUBLE PLY,6X48,STERILE: Brand: MEDLINE

## (undated) DEVICE — DRAPE,TOP,102X53,STERILE: Brand: MEDLINE

## (undated) DEVICE — STRETCH BANDAGE ROLL: Brand: DERMACEA

## (undated) DEVICE — SOLUTION IRRIG 1000ML 0.9% SOD CHL USP POUR PLAS BTL

## (undated) DEVICE — SUTURE NONABSORBABLE MONOFILAMENT 3-0 PS-1 18 IN BLK ETHILON 1663H

## (undated) DEVICE — STERILE HOOK LOCK LATEX FREE ELASTIC BANDAGE 3INX5YD: Brand: HOOK LOCK™